# Patient Record
Sex: FEMALE | Race: WHITE | HISPANIC OR LATINO | URBAN - METROPOLITAN AREA
[De-identification: names, ages, dates, MRNs, and addresses within clinical notes are randomized per-mention and may not be internally consistent; named-entity substitution may affect disease eponyms.]

---

## 2017-01-04 ENCOUNTER — EMERGENCY (EMERGENCY)
Facility: HOSPITAL | Age: 42
LOS: 1 days | Discharge: HOME | End: 2017-01-04
Attending: EMERGENCY MEDICINE
Payer: MEDICAID

## 2017-01-04 VITALS
SYSTOLIC BLOOD PRESSURE: 131 MMHG | HEIGHT: 65 IN | HEART RATE: 101 BPM | WEIGHT: 147.71 LBS | OXYGEN SATURATION: 100 % | TEMPERATURE: 98 F | DIASTOLIC BLOOD PRESSURE: 76 MMHG | RESPIRATION RATE: 20 BRPM

## 2017-01-04 DIAGNOSIS — Z98.84 BARIATRIC SURGERY STATUS: Chronic | ICD-10-CM

## 2017-01-04 DIAGNOSIS — F41.9 ANXIETY DISORDER, UNSPECIFIED: ICD-10-CM

## 2017-01-04 DIAGNOSIS — K21.9 GASTRO-ESOPHAGEAL REFLUX DISEASE WITHOUT ESOPHAGITIS: ICD-10-CM

## 2017-01-04 DIAGNOSIS — R69 ILLNESS, UNSPECIFIED: Chronic | ICD-10-CM

## 2017-01-04 DIAGNOSIS — R42 DIZZINESS AND GIDDINESS: ICD-10-CM

## 2017-01-04 DIAGNOSIS — Z98.84 BARIATRIC SURGERY STATUS: ICD-10-CM

## 2017-01-04 DIAGNOSIS — Z98.89 OTHER SPECIFIED POSTPROCEDURAL STATES: Chronic | ICD-10-CM

## 2017-01-04 DIAGNOSIS — F32.89 OTHER SPECIFIED DEPRESSIVE EPISODES: ICD-10-CM

## 2017-01-04 DIAGNOSIS — Z90.49 ACQUIRED ABSENCE OF OTHER SPECIFIED PARTS OF DIGESTIVE TRACT: Chronic | ICD-10-CM

## 2017-01-04 PROCEDURE — 99284 EMERGENCY DEPT VISIT MOD MDM: CPT | Mod: 25

## 2017-01-05 LAB
ANION GAP SERPL CALC-SCNC: 6 MMOL/L — SIGNIFICANT CHANGE UP (ref 5–17)
BUN SERPL-MCNC: 15 MG/DL — SIGNIFICANT CHANGE UP (ref 7–18)
CALCIUM SERPL-MCNC: 8.9 MG/DL — SIGNIFICANT CHANGE UP (ref 8.4–10.5)
CHLORIDE SERPL-SCNC: 108 MMOL/L — SIGNIFICANT CHANGE UP (ref 96–108)
CO2 SERPL-SCNC: 29 MMOL/L — SIGNIFICANT CHANGE UP (ref 22–31)
CREAT SERPL-MCNC: 0.41 MG/DL — LOW (ref 0.5–1.3)
GLUCOSE SERPL-MCNC: 74 MG/DL — SIGNIFICANT CHANGE UP (ref 70–99)
HCT VFR BLD CALC: 35.6 % — SIGNIFICANT CHANGE UP (ref 34.5–45)
HGB BLD-MCNC: 11.3 G/DL — LOW (ref 11.5–15.5)
MCHC RBC-ENTMCNC: 27.4 PG — SIGNIFICANT CHANGE UP (ref 27–34)
MCHC RBC-ENTMCNC: 31.6 GM/DL — LOW (ref 32–36)
MCV RBC AUTO: 86.7 FL — SIGNIFICANT CHANGE UP (ref 80–100)
PLATELET # BLD AUTO: 285 K/UL — SIGNIFICANT CHANGE UP (ref 150–400)
POTASSIUM SERPL-MCNC: 4.1 MMOL/L — SIGNIFICANT CHANGE UP (ref 3.5–5.3)
POTASSIUM SERPL-SCNC: 4.1 MMOL/L — SIGNIFICANT CHANGE UP (ref 3.5–5.3)
RBC # BLD: 4.11 M/UL — SIGNIFICANT CHANGE UP (ref 3.8–5.2)
RBC # FLD: 25.7 % — HIGH (ref 10.3–14.5)
SODIUM SERPL-SCNC: 143 MMOL/L — SIGNIFICANT CHANGE UP (ref 135–145)
WBC # BLD: 8.3 K/UL — SIGNIFICANT CHANGE UP (ref 3.8–10.5)
WBC # FLD AUTO: 8.3 K/UL — SIGNIFICANT CHANGE UP (ref 3.8–10.5)

## 2017-01-05 PROCEDURE — 93005 ELECTROCARDIOGRAM TRACING: CPT

## 2017-01-05 PROCEDURE — 80048 BASIC METABOLIC PNL TOTAL CA: CPT

## 2017-01-05 PROCEDURE — 99283 EMERGENCY DEPT VISIT LOW MDM: CPT

## 2017-01-05 PROCEDURE — 85027 COMPLETE CBC AUTOMATED: CPT

## 2017-01-05 RX ORDER — MECLIZINE HCL 12.5 MG
25 TABLET ORAL ONCE
Qty: 0 | Refills: 0 | Status: COMPLETED | OUTPATIENT
Start: 2017-01-05 | End: 2017-01-05

## 2017-01-05 RX ORDER — DIAZEPAM 5 MG
5 TABLET ORAL ONCE
Qty: 0 | Refills: 0 | Status: DISCONTINUED | OUTPATIENT
Start: 2017-01-05 | End: 2017-01-05

## 2017-01-05 RX ORDER — DIAZEPAM 5 MG
1 TABLET ORAL
Qty: 28 | Refills: 0
Start: 2017-01-05 | End: 2017-01-12

## 2017-01-05 RX ORDER — MECLIZINE HCL 12.5 MG
1 TABLET ORAL
Qty: 42 | Refills: 0
Start: 2017-01-05 | End: 2017-01-19

## 2017-01-05 RX ADMIN — Medication 25 MILLIGRAM(S): at 00:53

## 2017-01-05 RX ADMIN — Medication 5 MILLIGRAM(S): at 00:53

## 2017-01-05 NOTE — ED PROVIDER NOTE - PSH
H/O gastric bypass  2014 gastrostomy, lysis of adhesions  Perforated viscus  exploratory drainage of intraabdominal abcess and removal of mesh ring from around gastric pouch on 8/3/2014  S/P  section  x 2  S/P cholecystectomy    S/P vein stripping  right leg

## 2017-01-05 NOTE — ED PROVIDER NOTE - PMH
Anxiety    Depression    GERD (gastroesophageal reflux disease)    Narcolepsy    Obesity    JEFFREY (obstructive sleep apnea)  resolved after gastric bypass  Scoliosis of lumbar spine

## 2017-01-05 NOTE — ED PROVIDER NOTE - NS ED MD SCRIBE ATTENDING SCRIBE SECTIONS
REVIEW OF SYSTEMS/PHYSICAL EXAM/HISTORY OF PRESENT ILLNESS/VITAL SIGNS( Pullset)/PAST MEDICAL/SURGICAL/SOCIAL HISTORY/DISPOSITION/HIV

## 2017-01-05 NOTE — ED PROVIDER NOTE - OBJECTIVE STATEMENT
40 y/o F pt with no significant PMHx presents to ED c/o worsening dizziness, like room spinning, x yesterday. Pt also endorses vomiting and back pain. Pt notes that symptoms are aggravated with movement, and that she is unable to complete daily tasks without stopping. Pt has been admitted in the past for low Iron, and received a blood transfusion. Pt denies tinnitus, or any other complaints. NKDA.

## 2017-01-05 NOTE — ED PROVIDER NOTE - MEDICAL DECISION MAKING DETAILS
40 y/o F presenting with dizziness, like room spinning, and nausea. Likely peripheral vertigo, will check EKG, electrolytes, and manage symptoms. 40 y/o F presenting with dizziness, like room spinning, and nausea. Likely peripheral vertigo, will check EKG, electrolytes, and manage symptoms.   ECG NSR, at 79. Electrolytes wnl. pt feels well. has not been vertiginous since arrival. discussed anticipatory guidance. ENT follow up. meclizine rx

## 2017-04-01 ENCOUNTER — EMERGENCY (EMERGENCY)
Facility: HOSPITAL | Age: 42
LOS: 1 days | Discharge: HOME | End: 2017-04-01
Attending: EMERGENCY MEDICINE
Payer: SELF-PAY

## 2017-04-01 DIAGNOSIS — G47.33 OBSTRUCTIVE SLEEP APNEA (ADULT) (PEDIATRIC): ICD-10-CM

## 2017-04-01 DIAGNOSIS — F41.9 ANXIETY DISORDER, UNSPECIFIED: ICD-10-CM

## 2017-04-01 DIAGNOSIS — R69 ILLNESS, UNSPECIFIED: Chronic | ICD-10-CM

## 2017-04-01 DIAGNOSIS — Z98.84 BARIATRIC SURGERY STATUS: Chronic | ICD-10-CM

## 2017-04-01 DIAGNOSIS — Z98.89 OTHER SPECIFIED POSTPROCEDURAL STATES: Chronic | ICD-10-CM

## 2017-04-01 DIAGNOSIS — G47.419 NARCOLEPSY WITHOUT CATAPLEXY: ICD-10-CM

## 2017-04-01 DIAGNOSIS — R19.7 DIARRHEA, UNSPECIFIED: ICD-10-CM

## 2017-04-01 DIAGNOSIS — Z90.49 ACQUIRED ABSENCE OF OTHER SPECIFIED PARTS OF DIGESTIVE TRACT: Chronic | ICD-10-CM

## 2017-04-01 DIAGNOSIS — F32.89 OTHER SPECIFIED DEPRESSIVE EPISODES: ICD-10-CM

## 2017-04-01 DIAGNOSIS — K21.9 GASTRO-ESOPHAGEAL REFLUX DISEASE WITHOUT ESOPHAGITIS: ICD-10-CM

## 2017-04-01 PROCEDURE — 99053 MED SERV 10PM-8AM 24 HR FAC: CPT

## 2017-04-01 PROCEDURE — 99284 EMERGENCY DEPT VISIT MOD MDM: CPT | Mod: 25

## 2017-04-02 VITALS
HEART RATE: 79 BPM | DIASTOLIC BLOOD PRESSURE: 71 MMHG | TEMPERATURE: 98 F | SYSTOLIC BLOOD PRESSURE: 107 MMHG | RESPIRATION RATE: 16 BRPM | OXYGEN SATURATION: 97 %

## 2017-04-02 VITALS
OXYGEN SATURATION: 95 % | HEART RATE: 95 BPM | DIASTOLIC BLOOD PRESSURE: 71 MMHG | SYSTOLIC BLOOD PRESSURE: 120 MMHG | WEIGHT: 145.06 LBS | RESPIRATION RATE: 16 BRPM | TEMPERATURE: 99 F

## 2017-04-02 LAB
ANION GAP SERPL CALC-SCNC: 7 MMOL/L — SIGNIFICANT CHANGE UP (ref 5–17)
APPEARANCE UR: CLEAR — SIGNIFICANT CHANGE UP
BASOPHILS # BLD AUTO: 0.2 K/UL — SIGNIFICANT CHANGE UP (ref 0–0.2)
BASOPHILS NFR BLD AUTO: 1.7 % — SIGNIFICANT CHANGE UP (ref 0–2)
BILIRUB UR-MCNC: ABNORMAL
BUN SERPL-MCNC: 24 MG/DL — HIGH (ref 7–18)
CALCIUM SERPL-MCNC: 8.5 MG/DL — SIGNIFICANT CHANGE UP (ref 8.4–10.5)
CHLORIDE SERPL-SCNC: 108 MMOL/L — SIGNIFICANT CHANGE UP (ref 96–108)
CO2 SERPL-SCNC: 26 MMOL/L — SIGNIFICANT CHANGE UP (ref 22–31)
COLOR SPEC: YELLOW — SIGNIFICANT CHANGE UP
CREAT SERPL-MCNC: 0.59 MG/DL — SIGNIFICANT CHANGE UP (ref 0.5–1.3)
DIFF PNL FLD: ABNORMAL
EOSINOPHIL # BLD AUTO: 0.1 K/UL — SIGNIFICANT CHANGE UP (ref 0–0.5)
EOSINOPHIL NFR BLD AUTO: 0.7 % — SIGNIFICANT CHANGE UP (ref 0–6)
GLUCOSE SERPL-MCNC: 121 MG/DL — HIGH (ref 70–99)
GLUCOSE UR QL: 250
HCG UR QL: NEGATIVE — SIGNIFICANT CHANGE UP
HCT VFR BLD CALC: 33.2 % — LOW (ref 34.5–45)
HGB BLD-MCNC: 10.4 G/DL — LOW (ref 11.5–15.5)
KETONES UR-MCNC: ABNORMAL
LEUKOCYTE ESTERASE UR-ACNC: NEGATIVE — SIGNIFICANT CHANGE UP
LYMPHOCYTES # BLD AUTO: 3 K/UL — SIGNIFICANT CHANGE UP (ref 1–3.3)
LYMPHOCYTES # BLD AUTO: 34.1 % — SIGNIFICANT CHANGE UP (ref 13–44)
MCHC RBC-ENTMCNC: 29.9 PG — SIGNIFICANT CHANGE UP (ref 27–34)
MCHC RBC-ENTMCNC: 31.4 GM/DL — LOW (ref 32–36)
MCV RBC AUTO: 95.3 FL — SIGNIFICANT CHANGE UP (ref 80–100)
MONOCYTES # BLD AUTO: 0.8 K/UL — SIGNIFICANT CHANGE UP (ref 0–0.9)
MONOCYTES NFR BLD AUTO: 8.5 % — SIGNIFICANT CHANGE UP (ref 2–14)
NEUTROPHILS # BLD AUTO: 4.9 K/UL — SIGNIFICANT CHANGE UP (ref 1.8–7.4)
NEUTROPHILS NFR BLD AUTO: 55 % — SIGNIFICANT CHANGE UP (ref 43–77)
NITRITE UR-MCNC: NEGATIVE — SIGNIFICANT CHANGE UP
PH UR: 5 — SIGNIFICANT CHANGE UP (ref 4.8–8)
PLATELET # BLD AUTO: 461 K/UL — HIGH (ref 150–400)
POTASSIUM SERPL-MCNC: 3.9 MMOL/L — SIGNIFICANT CHANGE UP (ref 3.5–5.3)
POTASSIUM SERPL-SCNC: 3.9 MMOL/L — SIGNIFICANT CHANGE UP (ref 3.5–5.3)
PROT UR-MCNC: 30 MG/DL
RBC # BLD: 3.48 M/UL — LOW (ref 3.8–5.2)
RBC # FLD: 13.8 % — SIGNIFICANT CHANGE UP (ref 10.3–14.5)
SODIUM SERPL-SCNC: 141 MMOL/L — SIGNIFICANT CHANGE UP (ref 135–145)
SP GR SPEC: 1.02 — SIGNIFICANT CHANGE UP (ref 1.01–1.02)
UROBILINOGEN FLD QL: 1
WBC # BLD: 8.9 K/UL — SIGNIFICANT CHANGE UP (ref 3.8–10.5)
WBC # FLD AUTO: 8.9 K/UL — SIGNIFICANT CHANGE UP (ref 3.8–10.5)

## 2017-04-02 PROCEDURE — 85027 COMPLETE CBC AUTOMATED: CPT

## 2017-04-02 PROCEDURE — 81001 URINALYSIS AUTO W/SCOPE: CPT

## 2017-04-02 PROCEDURE — 81025 URINE PREGNANCY TEST: CPT

## 2017-04-02 PROCEDURE — 99283 EMERGENCY DEPT VISIT LOW MDM: CPT

## 2017-04-02 PROCEDURE — 80048 BASIC METABOLIC PNL TOTAL CA: CPT

## 2017-04-02 RX ORDER — SODIUM CHLORIDE 9 MG/ML
1000 INJECTION INTRAMUSCULAR; INTRAVENOUS; SUBCUTANEOUS ONCE
Qty: 0 | Refills: 0 | Status: COMPLETED | OUTPATIENT
Start: 2017-04-02 | End: 2017-04-02

## 2017-04-02 RX ADMIN — SODIUM CHLORIDE 1000 MILLILITER(S): 9 INJECTION INTRAMUSCULAR; INTRAVENOUS; SUBCUTANEOUS at 02:22

## 2017-04-02 NOTE — ED PROVIDER NOTE - OBJECTIVE STATEMENT
42 y/o female with past medical history of GERD and scoliosis and past surgical history of gastric sleeve presents with nausea and diarrhea.  pt denies vomiting.  Symptoms associated with mild epigastric discomfort.  Pt also with complaint of chronic back pain.  denies trauma, denies urinary symptoms.

## 2017-04-02 NOTE — ED ADULT NURSE NOTE - OBJECTIVE STATEMENT
41 years old  female with past medical history of GERD and scoliosis and past surgical history of gastric sleeve presents with nausea and diarrhea.  pt denies vomiting.  Symptoms associated with mild epigastric discomfort.  Pt also with complaint of chronic back pain.  denies trauma, denies urinary symptoms.

## 2017-04-02 NOTE — ED PROVIDER NOTE - MEDICAL DECISION MAKING DETAILS
Pt with nausea and diarrhea, no vomiting.  Will give GI cocktail, percocet for chronic back pain and reassess. Will also check UA for infection.  As per patient, she is currently on her menstrual cycle.

## 2018-01-01 ENCOUNTER — OUTPATIENT (OUTPATIENT)
Dept: OUTPATIENT SERVICES | Facility: HOSPITAL | Age: 43
LOS: 1 days | End: 2018-01-01
Payer: MEDICAID

## 2018-01-01 DIAGNOSIS — Z90.49 ACQUIRED ABSENCE OF OTHER SPECIFIED PARTS OF DIGESTIVE TRACT: Chronic | ICD-10-CM

## 2018-01-01 DIAGNOSIS — Z98.89 OTHER SPECIFIED POSTPROCEDURAL STATES: Chronic | ICD-10-CM

## 2018-01-01 DIAGNOSIS — Z98.84 BARIATRIC SURGERY STATUS: Chronic | ICD-10-CM

## 2018-01-01 DIAGNOSIS — R69 ILLNESS, UNSPECIFIED: Chronic | ICD-10-CM

## 2018-01-01 PROCEDURE — G9001: CPT

## 2018-01-26 ENCOUNTER — EMERGENCY (EMERGENCY)
Facility: HOSPITAL | Age: 43
LOS: 1 days | Discharge: HOME | End: 2018-01-26
Attending: EMERGENCY MEDICINE
Payer: MEDICAID

## 2018-01-26 VITALS
OXYGEN SATURATION: 100 % | DIASTOLIC BLOOD PRESSURE: 85 MMHG | HEART RATE: 72 BPM | RESPIRATION RATE: 16 BRPM | WEIGHT: 154.1 LBS | SYSTOLIC BLOOD PRESSURE: 131 MMHG | HEIGHT: 65 IN | TEMPERATURE: 98 F

## 2018-01-26 DIAGNOSIS — Z90.49 ACQUIRED ABSENCE OF OTHER SPECIFIED PARTS OF DIGESTIVE TRACT: Chronic | ICD-10-CM

## 2018-01-26 DIAGNOSIS — Z98.84 BARIATRIC SURGERY STATUS: Chronic | ICD-10-CM

## 2018-01-26 DIAGNOSIS — R69 ILLNESS, UNSPECIFIED: Chronic | ICD-10-CM

## 2018-01-26 DIAGNOSIS — Z98.89 OTHER SPECIFIED POSTPROCEDURAL STATES: Chronic | ICD-10-CM

## 2018-01-26 PROCEDURE — 99285 EMERGENCY DEPT VISIT HI MDM: CPT | Mod: 25

## 2018-01-26 RX ORDER — SUCRALFATE 1 G
1 TABLET ORAL ONCE
Qty: 0 | Refills: 0 | Status: COMPLETED | OUTPATIENT
Start: 2018-01-26 | End: 2018-01-26

## 2018-01-26 RX ORDER — ONDANSETRON 8 MG/1
4 TABLET, FILM COATED ORAL ONCE
Qty: 0 | Refills: 0 | Status: COMPLETED | OUTPATIENT
Start: 2018-01-26 | End: 2018-01-26

## 2018-01-26 RX ORDER — SODIUM CHLORIDE 9 MG/ML
1000 INJECTION INTRAMUSCULAR; INTRAVENOUS; SUBCUTANEOUS ONCE
Qty: 0 | Refills: 0 | Status: COMPLETED | OUTPATIENT
Start: 2018-01-26 | End: 2018-01-26

## 2018-01-26 RX ORDER — FAMOTIDINE 10 MG/ML
20 INJECTION INTRAVENOUS ONCE
Qty: 0 | Refills: 0 | Status: COMPLETED | OUTPATIENT
Start: 2018-01-26 | End: 2018-01-26

## 2018-01-27 VITALS
HEART RATE: 91 BPM | RESPIRATION RATE: 16 BRPM | OXYGEN SATURATION: 99 % | SYSTOLIC BLOOD PRESSURE: 122 MMHG | DIASTOLIC BLOOD PRESSURE: 78 MMHG | TEMPERATURE: 98 F

## 2018-01-27 LAB
ALBUMIN SERPL ELPH-MCNC: 4 G/DL — SIGNIFICANT CHANGE UP (ref 3.5–5)
ALP SERPL-CCNC: 86 U/L — SIGNIFICANT CHANGE UP (ref 40–120)
ALT FLD-CCNC: 26 U/L DA — SIGNIFICANT CHANGE UP (ref 10–60)
ANION GAP SERPL CALC-SCNC: 7 MMOL/L — SIGNIFICANT CHANGE UP (ref 5–17)
APPEARANCE UR: CLEAR — SIGNIFICANT CHANGE UP
AST SERPL-CCNC: 16 U/L — SIGNIFICANT CHANGE UP (ref 10–40)
BASOPHILS # BLD AUTO: 0.1 K/UL — SIGNIFICANT CHANGE UP (ref 0–0.2)
BASOPHILS NFR BLD AUTO: 1.6 % — SIGNIFICANT CHANGE UP (ref 0–2)
BILIRUB SERPL-MCNC: 0.6 MG/DL — SIGNIFICANT CHANGE UP (ref 0.2–1.2)
BILIRUB UR-MCNC: ABNORMAL
BUN SERPL-MCNC: 18 MG/DL — SIGNIFICANT CHANGE UP (ref 7–18)
CALCIUM SERPL-MCNC: 8.7 MG/DL — SIGNIFICANT CHANGE UP (ref 8.4–10.5)
CHLORIDE SERPL-SCNC: 107 MMOL/L — SIGNIFICANT CHANGE UP (ref 96–108)
CO2 SERPL-SCNC: 27 MMOL/L — SIGNIFICANT CHANGE UP (ref 22–31)
COLOR SPEC: YELLOW — SIGNIFICANT CHANGE UP
CREAT SERPL-MCNC: 0.65 MG/DL — SIGNIFICANT CHANGE UP (ref 0.5–1.3)
DIFF PNL FLD: ABNORMAL
EOSINOPHIL # BLD AUTO: 0 K/UL — SIGNIFICANT CHANGE UP (ref 0–0.5)
EOSINOPHIL NFR BLD AUTO: 0.6 % — SIGNIFICANT CHANGE UP (ref 0–6)
GLUCOSE SERPL-MCNC: 94 MG/DL — SIGNIFICANT CHANGE UP (ref 70–99)
GLUCOSE UR QL: NEGATIVE — SIGNIFICANT CHANGE UP
HCG SERPL-ACNC: <1 MIU/ML — SIGNIFICANT CHANGE UP
HCT VFR BLD CALC: 39.1 % — SIGNIFICANT CHANGE UP (ref 34.5–45)
HGB BLD-MCNC: 11.7 G/DL — SIGNIFICANT CHANGE UP (ref 11.5–15.5)
KETONES UR-MCNC: NEGATIVE — SIGNIFICANT CHANGE UP
LEUKOCYTE ESTERASE UR-ACNC: ABNORMAL
LIDOCAIN IGE QN: 340 U/L — SIGNIFICANT CHANGE UP (ref 73–393)
LYMPHOCYTES # BLD AUTO: 2.8 K/UL — SIGNIFICANT CHANGE UP (ref 1–3.3)
LYMPHOCYTES # BLD AUTO: 39.3 % — SIGNIFICANT CHANGE UP (ref 13–44)
MCHC RBC-ENTMCNC: 29.9 GM/DL — LOW (ref 32–36)
MCHC RBC-ENTMCNC: 33.3 PG — SIGNIFICANT CHANGE UP (ref 27–34)
MCV RBC AUTO: 111.4 FL — HIGH (ref 80–100)
MONOCYTES # BLD AUTO: 0.6 K/UL — SIGNIFICANT CHANGE UP (ref 0–0.9)
MONOCYTES NFR BLD AUTO: 8.4 % — SIGNIFICANT CHANGE UP (ref 2–14)
NEUTROPHILS # BLD AUTO: 3.5 K/UL — SIGNIFICANT CHANGE UP (ref 1.8–7.4)
NEUTROPHILS NFR BLD AUTO: 50.1 % — SIGNIFICANT CHANGE UP (ref 43–77)
NITRITE UR-MCNC: NEGATIVE — SIGNIFICANT CHANGE UP
PH UR: 5 — SIGNIFICANT CHANGE UP (ref 5–8)
PLATELET # BLD AUTO: 405 K/UL — HIGH (ref 150–400)
POTASSIUM SERPL-MCNC: 4.3 MMOL/L — SIGNIFICANT CHANGE UP (ref 3.5–5.3)
POTASSIUM SERPL-SCNC: 4.3 MMOL/L — SIGNIFICANT CHANGE UP (ref 3.5–5.3)
PROT SERPL-MCNC: 7.7 G/DL — SIGNIFICANT CHANGE UP (ref 6–8.3)
PROT UR-MCNC: 30 MG/DL
RBC # BLD: 3.51 M/UL — LOW (ref 3.8–5.2)
RBC # FLD: 12.1 % — SIGNIFICANT CHANGE UP (ref 10.3–14.5)
SODIUM SERPL-SCNC: 141 MMOL/L — SIGNIFICANT CHANGE UP (ref 135–145)
SP GR SPEC: 1.02 — SIGNIFICANT CHANGE UP (ref 1.01–1.02)
UROBILINOGEN FLD QL: 1
WBC # BLD: 7 K/UL — SIGNIFICANT CHANGE UP (ref 3.8–10.5)
WBC # FLD AUTO: 7 K/UL — SIGNIFICANT CHANGE UP (ref 3.8–10.5)

## 2018-01-27 PROCEDURE — 96375 TX/PRO/DX INJ NEW DRUG ADDON: CPT

## 2018-01-27 PROCEDURE — 74177 CT ABD & PELVIS W/CONTRAST: CPT

## 2018-01-27 PROCEDURE — 80053 COMPREHEN METABOLIC PANEL: CPT

## 2018-01-27 PROCEDURE — 83690 ASSAY OF LIPASE: CPT

## 2018-01-27 PROCEDURE — 87086 URINE CULTURE/COLONY COUNT: CPT

## 2018-01-27 PROCEDURE — 96374 THER/PROPH/DIAG INJ IV PUSH: CPT | Mod: XU

## 2018-01-27 PROCEDURE — 84702 CHORIONIC GONADOTROPIN TEST: CPT

## 2018-01-27 PROCEDURE — 93005 ELECTROCARDIOGRAM TRACING: CPT

## 2018-01-27 PROCEDURE — 81001 URINALYSIS AUTO W/SCOPE: CPT

## 2018-01-27 PROCEDURE — 85027 COMPLETE CBC AUTOMATED: CPT

## 2018-01-27 PROCEDURE — 93010 ELECTROCARDIOGRAM REPORT: CPT

## 2018-01-27 PROCEDURE — 99284 EMERGENCY DEPT VISIT MOD MDM: CPT | Mod: 25

## 2018-01-27 PROCEDURE — 74177 CT ABD & PELVIS W/CONTRAST: CPT | Mod: 26

## 2018-01-27 RX ORDER — MORPHINE SULFATE 50 MG/1
4 CAPSULE, EXTENDED RELEASE ORAL ONCE
Qty: 0 | Refills: 0 | Status: COMPLETED | OUTPATIENT
Start: 2018-01-27 | End: 2018-01-27

## 2018-01-27 RX ORDER — FAMOTIDINE 10 MG/ML
1 INJECTION INTRAVENOUS
Qty: 14 | Refills: 0
Start: 2018-01-27 | End: 2018-02-02

## 2018-01-27 RX ORDER — MORPHINE SULFATE 50 MG/1
4 CAPSULE, EXTENDED RELEASE ORAL ONCE
Qty: 0 | Refills: 0 | Status: DISCONTINUED | OUTPATIENT
Start: 2018-01-27 | End: 2018-01-27

## 2018-01-27 RX ADMIN — ONDANSETRON 4 MILLIGRAM(S): 8 TABLET, FILM COATED ORAL at 01:18

## 2018-01-27 RX ADMIN — FAMOTIDINE 20 MILLIGRAM(S): 10 INJECTION INTRAVENOUS at 01:18

## 2018-01-27 RX ADMIN — Medication 1 GRAM(S): at 05:34

## 2018-01-27 RX ADMIN — SODIUM CHLORIDE 1000 MILLILITER(S): 9 INJECTION INTRAMUSCULAR; INTRAVENOUS; SUBCUTANEOUS at 01:18

## 2018-01-27 RX ADMIN — MORPHINE SULFATE 4 MILLIGRAM(S): 50 CAPSULE, EXTENDED RELEASE ORAL at 05:35

## 2018-01-27 RX ADMIN — MORPHINE SULFATE 4 MILLIGRAM(S): 50 CAPSULE, EXTENDED RELEASE ORAL at 06:12

## 2018-01-27 NOTE — ED PROVIDER NOTE - OBJECTIVE STATEMENT
36 y/o F pt w/ PMHx of GERD and renal stone and PSHx of gastric bypass, presents to ED c/o abd pain x yesterday. Pt reports worsening pain, described burning, associated w/ dizziness and 2 episodes of vomiting. Pt states she has been taking Tylenol w/ no improvement of sx. Pt states she was seen here a few months for similar sx. Pt denies any difficulty breathing, fever, burning w/ urination, or any other complaints. ALLERGIES as listed below.

## 2018-01-27 NOTE — ED PROVIDER NOTE - PMH
Anxiety    Depression    GERD (gastroesophageal reflux disease)    Narcolepsy    Obesity    JEFFREY (obstructive sleep apnea)  resolved after gastric bypass  Renal stone    Scoliosis of lumbar spine

## 2018-01-28 LAB
CULTURE RESULTS: SIGNIFICANT CHANGE UP
SPECIMEN SOURCE: SIGNIFICANT CHANGE UP

## 2018-01-30 DIAGNOSIS — R69 ILLNESS, UNSPECIFIED: ICD-10-CM

## 2018-10-02 ENCOUNTER — EMERGENCY (EMERGENCY)
Facility: HOSPITAL | Age: 43
LOS: 1 days | Discharge: HOME | End: 2018-10-02
Attending: EMERGENCY MEDICINE
Payer: MEDICAID

## 2018-10-02 VITALS
TEMPERATURE: 98 F | DIASTOLIC BLOOD PRESSURE: 56 MMHG | RESPIRATION RATE: 18 BRPM | HEART RATE: 73 BPM | SYSTOLIC BLOOD PRESSURE: 103 MMHG | OXYGEN SATURATION: 100 %

## 2018-10-02 VITALS
RESPIRATION RATE: 20 BRPM | HEART RATE: 77 BPM | OXYGEN SATURATION: 100 % | SYSTOLIC BLOOD PRESSURE: 148 MMHG | WEIGHT: 154.98 LBS | TEMPERATURE: 98 F | DIASTOLIC BLOOD PRESSURE: 88 MMHG | HEIGHT: 65 IN

## 2018-10-02 DIAGNOSIS — R69 ILLNESS, UNSPECIFIED: Chronic | ICD-10-CM

## 2018-10-02 DIAGNOSIS — Z98.89 OTHER SPECIFIED POSTPROCEDURAL STATES: Chronic | ICD-10-CM

## 2018-10-02 DIAGNOSIS — Z90.49 ACQUIRED ABSENCE OF OTHER SPECIFIED PARTS OF DIGESTIVE TRACT: Chronic | ICD-10-CM

## 2018-10-02 DIAGNOSIS — Z98.84 BARIATRIC SURGERY STATUS: Chronic | ICD-10-CM

## 2018-10-02 PROBLEM — N20.0 CALCULUS OF KIDNEY: Chronic | Status: ACTIVE | Noted: 2018-01-27

## 2018-10-02 LAB
ALBUMIN SERPL ELPH-MCNC: 3.4 G/DL — LOW (ref 3.5–5)
ALP SERPL-CCNC: 84 U/L — SIGNIFICANT CHANGE UP (ref 40–120)
ALT FLD-CCNC: 25 U/L DA — SIGNIFICANT CHANGE UP (ref 10–60)
ANION GAP SERPL CALC-SCNC: 6 MMOL/L — SIGNIFICANT CHANGE UP (ref 5–17)
APPEARANCE UR: CLEAR — SIGNIFICANT CHANGE UP
AST SERPL-CCNC: 22 U/L — SIGNIFICANT CHANGE UP (ref 10–40)
BASOPHILS # BLD AUTO: 0.1 K/UL — SIGNIFICANT CHANGE UP (ref 0–0.2)
BASOPHILS NFR BLD AUTO: 1.5 % — SIGNIFICANT CHANGE UP (ref 0–2)
BILIRUB SERPL-MCNC: 0.3 MG/DL — SIGNIFICANT CHANGE UP (ref 0.2–1.2)
BILIRUB UR-MCNC: NEGATIVE — SIGNIFICANT CHANGE UP
BUN SERPL-MCNC: 20 MG/DL — HIGH (ref 7–18)
CALCIUM SERPL-MCNC: 8.6 MG/DL — SIGNIFICANT CHANGE UP (ref 8.4–10.5)
CHLORIDE SERPL-SCNC: 107 MMOL/L — SIGNIFICANT CHANGE UP (ref 96–108)
CO2 SERPL-SCNC: 28 MMOL/L — SIGNIFICANT CHANGE UP (ref 22–31)
COLOR SPEC: YELLOW — SIGNIFICANT CHANGE UP
CREAT SERPL-MCNC: 0.64 MG/DL — SIGNIFICANT CHANGE UP (ref 0.5–1.3)
DIFF PNL FLD: ABNORMAL
EOSINOPHIL # BLD AUTO: 0.2 K/UL — SIGNIFICANT CHANGE UP (ref 0–0.5)
EOSINOPHIL NFR BLD AUTO: 2.1 % — SIGNIFICANT CHANGE UP (ref 0–6)
GLUCOSE SERPL-MCNC: 90 MG/DL — SIGNIFICANT CHANGE UP (ref 70–99)
GLUCOSE UR QL: NEGATIVE — SIGNIFICANT CHANGE UP
HCG SERPL-ACNC: <1 MIU/ML — SIGNIFICANT CHANGE UP
HCT VFR BLD CALC: 34.3 % — LOW (ref 34.5–45)
HGB BLD-MCNC: 10.6 G/DL — LOW (ref 11.5–15.5)
KETONES UR-MCNC: NEGATIVE — SIGNIFICANT CHANGE UP
LEUKOCYTE ESTERASE UR-ACNC: ABNORMAL
LIDOCAIN IGE QN: 261 U/L — SIGNIFICANT CHANGE UP (ref 73–393)
LYMPHOCYTES # BLD AUTO: 2.8 K/UL — SIGNIFICANT CHANGE UP (ref 1–3.3)
LYMPHOCYTES # BLD AUTO: 29.8 % — SIGNIFICANT CHANGE UP (ref 13–44)
MCHC RBC-ENTMCNC: 27.8 PG — SIGNIFICANT CHANGE UP (ref 27–34)
MCHC RBC-ENTMCNC: 30.7 GM/DL — LOW (ref 32–36)
MCV RBC AUTO: 90.4 FL — SIGNIFICANT CHANGE UP (ref 80–100)
MONOCYTES # BLD AUTO: 0.8 K/UL — SIGNIFICANT CHANGE UP (ref 0–0.9)
MONOCYTES NFR BLD AUTO: 9 % — SIGNIFICANT CHANGE UP (ref 2–14)
NEUTROPHILS # BLD AUTO: 5.4 K/UL — SIGNIFICANT CHANGE UP (ref 1.8–7.4)
NEUTROPHILS NFR BLD AUTO: 57.5 % — SIGNIFICANT CHANGE UP (ref 43–77)
NITRITE UR-MCNC: NEGATIVE — SIGNIFICANT CHANGE UP
PH UR: 6.5 — SIGNIFICANT CHANGE UP (ref 5–8)
PLATELET # BLD AUTO: 354 K/UL — SIGNIFICANT CHANGE UP (ref 150–400)
POTASSIUM SERPL-MCNC: 4.8 MMOL/L — SIGNIFICANT CHANGE UP (ref 3.5–5.3)
POTASSIUM SERPL-SCNC: 4.8 MMOL/L — SIGNIFICANT CHANGE UP (ref 3.5–5.3)
PROT SERPL-MCNC: 7.5 G/DL — SIGNIFICANT CHANGE UP (ref 6–8.3)
PROT UR-MCNC: 15
RBC # BLD: 3.79 M/UL — LOW (ref 3.8–5.2)
RBC # FLD: 13.6 % — SIGNIFICANT CHANGE UP (ref 10.3–14.5)
SODIUM SERPL-SCNC: 141 MMOL/L — SIGNIFICANT CHANGE UP (ref 135–145)
SP GR SPEC: 1.01 — SIGNIFICANT CHANGE UP (ref 1.01–1.02)
UROBILINOGEN FLD QL: NEGATIVE — SIGNIFICANT CHANGE UP
WBC # BLD: 9.4 K/UL — SIGNIFICANT CHANGE UP (ref 3.8–10.5)
WBC # FLD AUTO: 9.4 K/UL — SIGNIFICANT CHANGE UP (ref 3.8–10.5)

## 2018-10-02 PROCEDURE — 85027 COMPLETE CBC AUTOMATED: CPT

## 2018-10-02 PROCEDURE — 99285 EMERGENCY DEPT VISIT HI MDM: CPT | Mod: 25

## 2018-10-02 PROCEDURE — 74176 CT ABD & PELVIS W/O CONTRAST: CPT | Mod: 26

## 2018-10-02 PROCEDURE — 81001 URINALYSIS AUTO W/SCOPE: CPT

## 2018-10-02 PROCEDURE — 96375 TX/PRO/DX INJ NEW DRUG ADDON: CPT

## 2018-10-02 PROCEDURE — 84702 CHORIONIC GONADOTROPIN TEST: CPT

## 2018-10-02 PROCEDURE — 80053 COMPREHEN METABOLIC PANEL: CPT

## 2018-10-02 PROCEDURE — 96374 THER/PROPH/DIAG INJ IV PUSH: CPT

## 2018-10-02 PROCEDURE — 99284 EMERGENCY DEPT VISIT MOD MDM: CPT | Mod: 25

## 2018-10-02 PROCEDURE — 83690 ASSAY OF LIPASE: CPT

## 2018-10-02 PROCEDURE — 74176 CT ABD & PELVIS W/O CONTRAST: CPT

## 2018-10-02 RX ORDER — CEFUROXIME AXETIL 250 MG
500 TABLET ORAL ONCE
Qty: 0 | Refills: 0 | Status: DISCONTINUED | OUTPATIENT
Start: 2018-10-02 | End: 2018-10-06

## 2018-10-02 RX ORDER — SODIUM CHLORIDE 9 MG/ML
1000 INJECTION INTRAMUSCULAR; INTRAVENOUS; SUBCUTANEOUS
Qty: 0 | Refills: 0 | Status: DISCONTINUED | OUTPATIENT
Start: 2018-10-02 | End: 2018-10-06

## 2018-10-02 RX ORDER — SODIUM CHLORIDE 9 MG/ML
3 INJECTION INTRAMUSCULAR; INTRAVENOUS; SUBCUTANEOUS ONCE
Qty: 0 | Refills: 0 | Status: COMPLETED | OUTPATIENT
Start: 2018-10-02 | End: 2018-10-02

## 2018-10-02 RX ORDER — SODIUM CHLORIDE 9 MG/ML
1000 INJECTION INTRAMUSCULAR; INTRAVENOUS; SUBCUTANEOUS ONCE
Qty: 0 | Refills: 0 | Status: COMPLETED | OUTPATIENT
Start: 2018-10-02 | End: 2018-10-02

## 2018-10-02 RX ORDER — ONDANSETRON 8 MG/1
4 TABLET, FILM COATED ORAL ONCE
Qty: 0 | Refills: 0 | Status: COMPLETED | OUTPATIENT
Start: 2018-10-02 | End: 2018-10-02

## 2018-10-02 RX ORDER — CEFUROXIME AXETIL 250 MG
1 TABLET ORAL
Qty: 14 | Refills: 0
Start: 2018-10-02 | End: 2018-10-08

## 2018-10-02 RX ORDER — KETOROLAC TROMETHAMINE 30 MG/ML
30 SYRINGE (ML) INJECTION ONCE
Qty: 0 | Refills: 0 | Status: DISCONTINUED | OUTPATIENT
Start: 2018-10-02 | End: 2018-10-02

## 2018-10-02 RX ORDER — PHENAZOPYRIDINE HCL 100 MG
1 TABLET ORAL
Qty: 6 | Refills: 0
Start: 2018-10-02 | End: 2018-10-03

## 2018-10-02 RX ADMIN — SODIUM CHLORIDE 125 MILLILITER(S): 9 INJECTION INTRAMUSCULAR; INTRAVENOUS; SUBCUTANEOUS at 05:15

## 2018-10-02 RX ADMIN — SODIUM CHLORIDE 3 MILLILITER(S): 9 INJECTION INTRAMUSCULAR; INTRAVENOUS; SUBCUTANEOUS at 02:50

## 2018-10-02 RX ADMIN — SODIUM CHLORIDE 1000 MILLILITER(S): 9 INJECTION INTRAMUSCULAR; INTRAVENOUS; SUBCUTANEOUS at 02:49

## 2018-10-02 RX ADMIN — Medication 30 MILLIGRAM(S): at 05:14

## 2018-10-02 RX ADMIN — ONDANSETRON 4 MILLIGRAM(S): 8 TABLET, FILM COATED ORAL at 02:49

## 2018-10-02 NOTE — ED ADULT NURSE NOTE - NSIMPLEMENTINTERV_GEN_ALL_ED
Implemented All Universal Safety Interventions:  [place] to call system. Call bell, personal items and telephone within reach. Instruct patient to call for assistance. Room bathroom lighting operational. Non-slip footwear when patient is off stretcher. Physically safe environment: no spills, clutter or unnecessary equipment. Stretcher in lowest position, wheels locked, appropriate side rails in place.

## 2018-10-02 NOTE — ED ADULT TRIAGE NOTE - CHIEF COMPLAINT QUOTE
pt complain of stomach pain with nausea, vomiting and diarrhea started 2 today ago, pain is getting worse

## 2018-10-02 NOTE — ED PROVIDER NOTE - CONSTITUTIONAL, MLM
Rebecca normal... Well appearing, well nourished, awake, alert, oriented to person, place, time/situation and in no apparent distress.

## 2018-10-02 NOTE — ED PROVIDER NOTE - OBJECTIVE STATEMENT
Chief complaint of lower abdominal; pain, right flank pain x 2 days, + dysuria. No vomiting, no fever.  Denies vaginal bleeding.

## 2018-10-02 NOTE — ED PROVIDER NOTE - MEDICAL DECISION MAKING DETAILS
No obstruction reported on Ct. Pt is well appearing walking with normal gait, stable for discharge and follow up with medical doctor. Pt educated on care and need for follow up. Discussed anticipatory guidance and return precautions. Questions answered. I had a detailed discussion with the patient and/or guardian regarding the historical points, exam findings, and any diagnostic results supporting the discharge diagnosis. Rx: Ceftin.

## 2019-01-30 ENCOUNTER — EMERGENCY (EMERGENCY)
Facility: HOSPITAL | Age: 44
LOS: 1 days | Discharge: HOME | End: 2019-01-30
Attending: EMERGENCY MEDICINE
Payer: MEDICAID

## 2019-01-30 VITALS
HEIGHT: 65 IN | SYSTOLIC BLOOD PRESSURE: 128 MMHG | OXYGEN SATURATION: 100 % | RESPIRATION RATE: 18 BRPM | TEMPERATURE: 97 F | DIASTOLIC BLOOD PRESSURE: 87 MMHG | WEIGHT: 149.91 LBS | HEART RATE: 89 BPM

## 2019-01-30 DIAGNOSIS — Z98.89 OTHER SPECIFIED POSTPROCEDURAL STATES: Chronic | ICD-10-CM

## 2019-01-30 DIAGNOSIS — Z98.84 BARIATRIC SURGERY STATUS: Chronic | ICD-10-CM

## 2019-01-30 DIAGNOSIS — R69 ILLNESS, UNSPECIFIED: Chronic | ICD-10-CM

## 2019-01-30 DIAGNOSIS — Z90.49 ACQUIRED ABSENCE OF OTHER SPECIFIED PARTS OF DIGESTIVE TRACT: Chronic | ICD-10-CM

## 2019-01-30 LAB
ALBUMIN SERPL ELPH-MCNC: 3.8 G/DL — SIGNIFICANT CHANGE UP (ref 3.5–5)
ALP SERPL-CCNC: 83 U/L — SIGNIFICANT CHANGE UP (ref 40–120)
ALT FLD-CCNC: 22 U/L DA — SIGNIFICANT CHANGE UP (ref 10–60)
ANION GAP SERPL CALC-SCNC: 7 MMOL/L — SIGNIFICANT CHANGE UP (ref 5–17)
APPEARANCE UR: CLEAR — SIGNIFICANT CHANGE UP
APTT BLD: 26.7 SEC — LOW (ref 27.5–36.3)
AST SERPL-CCNC: 18 U/L — SIGNIFICANT CHANGE UP (ref 10–40)
BACTERIA # UR AUTO: ABNORMAL /HPF
BASOPHILS # BLD AUTO: 0.1 K/UL — SIGNIFICANT CHANGE UP (ref 0–0.2)
BASOPHILS NFR BLD AUTO: 1.4 % — SIGNIFICANT CHANGE UP (ref 0–2)
BILIRUB SERPL-MCNC: 0.7 MG/DL — SIGNIFICANT CHANGE UP (ref 0.2–1.2)
BILIRUB UR-MCNC: NEGATIVE — SIGNIFICANT CHANGE UP
BUN SERPL-MCNC: 12 MG/DL — SIGNIFICANT CHANGE UP (ref 7–18)
CALCIUM SERPL-MCNC: 8.6 MG/DL — SIGNIFICANT CHANGE UP (ref 8.4–10.5)
CHLORIDE SERPL-SCNC: 107 MMOL/L — SIGNIFICANT CHANGE UP (ref 96–108)
CK SERPL-CCNC: 60 U/L — SIGNIFICANT CHANGE UP (ref 21–215)
CO2 SERPL-SCNC: 27 MMOL/L — SIGNIFICANT CHANGE UP (ref 22–31)
COLOR SPEC: YELLOW — SIGNIFICANT CHANGE UP
CREAT SERPL-MCNC: 0.61 MG/DL — SIGNIFICANT CHANGE UP (ref 0.5–1.3)
DIFF PNL FLD: ABNORMAL
EOSINOPHIL # BLD AUTO: 0.1 K/UL — SIGNIFICANT CHANGE UP (ref 0–0.5)
EOSINOPHIL NFR BLD AUTO: 0.8 % — SIGNIFICANT CHANGE UP (ref 0–6)
EPI CELLS # UR: ABNORMAL /HPF
GLUCOSE SERPL-MCNC: 103 MG/DL — HIGH (ref 70–99)
GLUCOSE UR QL: NEGATIVE — SIGNIFICANT CHANGE UP
HCG UR QL: NEGATIVE — SIGNIFICANT CHANGE UP
HCT VFR BLD CALC: 33.5 % — LOW (ref 34.5–45)
HGB BLD-MCNC: 10.1 G/DL — LOW (ref 11.5–15.5)
INR BLD: 0.98 RATIO — SIGNIFICANT CHANGE UP (ref 0.88–1.16)
KETONES UR-MCNC: NEGATIVE — SIGNIFICANT CHANGE UP
LEUKOCYTE ESTERASE UR-ACNC: ABNORMAL
LYMPHOCYTES # BLD AUTO: 2.1 K/UL — SIGNIFICANT CHANGE UP (ref 1–3.3)
LYMPHOCYTES # BLD AUTO: 25.8 % — SIGNIFICANT CHANGE UP (ref 13–44)
MCHC RBC-ENTMCNC: 27 PG — SIGNIFICANT CHANGE UP (ref 27–34)
MCHC RBC-ENTMCNC: 30.2 GM/DL — LOW (ref 32–36)
MCV RBC AUTO: 89.5 FL — SIGNIFICANT CHANGE UP (ref 80–100)
MONOCYTES # BLD AUTO: 0.6 K/UL — SIGNIFICANT CHANGE UP (ref 0–0.9)
MONOCYTES NFR BLD AUTO: 7.3 % — SIGNIFICANT CHANGE UP (ref 2–14)
NEUTROPHILS # BLD AUTO: 5.4 K/UL — SIGNIFICANT CHANGE UP (ref 1.8–7.4)
NEUTROPHILS NFR BLD AUTO: 64.6 % — SIGNIFICANT CHANGE UP (ref 43–77)
NITRITE UR-MCNC: NEGATIVE — SIGNIFICANT CHANGE UP
PH UR: 6.5 — SIGNIFICANT CHANGE UP (ref 5–8)
PLATELET # BLD AUTO: 422 K/UL — HIGH (ref 150–400)
POTASSIUM SERPL-MCNC: 4.1 MMOL/L — SIGNIFICANT CHANGE UP (ref 3.5–5.3)
POTASSIUM SERPL-SCNC: 4.1 MMOL/L — SIGNIFICANT CHANGE UP (ref 3.5–5.3)
PROT SERPL-MCNC: 7.7 G/DL — SIGNIFICANT CHANGE UP (ref 6–8.3)
PROT UR-MCNC: NEGATIVE — SIGNIFICANT CHANGE UP
PROTHROM AB SERPL-ACNC: 10.9 SEC — SIGNIFICANT CHANGE UP (ref 10–12.9)
RBC # BLD: 3.75 M/UL — LOW (ref 3.8–5.2)
RBC # FLD: 15.2 % — HIGH (ref 10.3–14.5)
RBC CASTS # UR COMP ASSIST: ABNORMAL /HPF (ref 0–2)
SODIUM SERPL-SCNC: 141 MMOL/L — SIGNIFICANT CHANGE UP (ref 135–145)
SP GR SPEC: 1.01 — SIGNIFICANT CHANGE UP (ref 1.01–1.02)
TROPONIN I SERPL-MCNC: <0.015 NG/ML — SIGNIFICANT CHANGE UP (ref 0–0.04)
UROBILINOGEN FLD QL: NEGATIVE — SIGNIFICANT CHANGE UP
WBC # BLD: 8.3 K/UL — SIGNIFICANT CHANGE UP (ref 3.8–10.5)
WBC # FLD AUTO: 8.3 K/UL — SIGNIFICANT CHANGE UP (ref 3.8–10.5)
WBC UR QL: ABNORMAL /HPF (ref 0–5)

## 2019-01-30 PROCEDURE — 93010 ELECTROCARDIOGRAM REPORT: CPT

## 2019-01-30 PROCEDURE — 99285 EMERGENCY DEPT VISIT HI MDM: CPT | Mod: 25

## 2019-01-30 PROCEDURE — 71046 X-RAY EXAM CHEST 2 VIEWS: CPT | Mod: 26

## 2019-01-30 RX ORDER — KETOROLAC TROMETHAMINE 30 MG/ML
30 SYRINGE (ML) INJECTION ONCE
Qty: 0 | Refills: 0 | Status: DISCONTINUED | OUTPATIENT
Start: 2019-01-30 | End: 2019-01-30

## 2019-01-30 RX ORDER — SODIUM CHLORIDE 9 MG/ML
1000 INJECTION INTRAMUSCULAR; INTRAVENOUS; SUBCUTANEOUS
Qty: 0 | Refills: 0 | Status: DISCONTINUED | OUTPATIENT
Start: 2019-01-30 | End: 2019-02-03

## 2019-01-30 RX ORDER — ASPIRIN/CALCIUM CARB/MAGNESIUM 324 MG
81 TABLET ORAL ONCE
Qty: 0 | Refills: 0 | Status: COMPLETED | OUTPATIENT
Start: 2019-01-30 | End: 2019-01-30

## 2019-01-30 RX ADMIN — Medication 30 MILLIGRAM(S): at 21:41

## 2019-01-30 RX ADMIN — Medication 81 MILLIGRAM(S): at 20:15

## 2019-01-30 NOTE — ED PROVIDER NOTE - CARDIAC, MLM
Normal rate, regular rhythm.  Heart sounds S1, S2.  No murmurs, rubs or gallops. chest-Lt reproducible CP

## 2019-01-30 NOTE — ED PROVIDER NOTE - OBJECTIVE STATEMENT
43 y.o. female LMP 3 weeks ago, pt c/o Lt sided chest pain, "something pushing", also pinching pain on & off for 5 days, nonradiating, no sob, 43 y.o. female LMP 3 weeks ago, pt c/o Lt sided chest pain, "something pushing", also pinching pain on & off for 5 days, nonradiating, palpitations, lightheadedness, no sob, recent travelling, not on BCP, coughing, fever, pt with dysuria, urinary frequency, vaginal itchiness with discharge

## 2019-01-31 PROCEDURE — 82550 ASSAY OF CK (CPK): CPT

## 2019-01-31 PROCEDURE — 36415 COLL VENOUS BLD VENIPUNCTURE: CPT

## 2019-01-31 PROCEDURE — 81025 URINE PREGNANCY TEST: CPT

## 2019-01-31 PROCEDURE — 93005 ELECTROCARDIOGRAM TRACING: CPT

## 2019-01-31 PROCEDURE — 71046 X-RAY EXAM CHEST 2 VIEWS: CPT

## 2019-01-31 PROCEDURE — 99284 EMERGENCY DEPT VISIT MOD MDM: CPT | Mod: 25

## 2019-01-31 PROCEDURE — 85730 THROMBOPLASTIN TIME PARTIAL: CPT

## 2019-01-31 PROCEDURE — 96374 THER/PROPH/DIAG INJ IV PUSH: CPT

## 2019-01-31 PROCEDURE — 80053 COMPREHEN METABOLIC PANEL: CPT

## 2019-01-31 PROCEDURE — 81001 URINALYSIS AUTO W/SCOPE: CPT

## 2019-01-31 PROCEDURE — 85610 PROTHROMBIN TIME: CPT

## 2019-01-31 PROCEDURE — 85027 COMPLETE CBC AUTOMATED: CPT

## 2019-01-31 PROCEDURE — 84484 ASSAY OF TROPONIN QUANT: CPT

## 2019-01-31 RX ORDER — FLUCONAZOLE 150 MG/1
1 TABLET ORAL
Qty: 1 | Refills: 0
Start: 2019-01-31

## 2019-01-31 RX ORDER — FLUCONAZOLE 150 MG/1
150 TABLET ORAL ONCE
Qty: 0 | Refills: 0 | Status: COMPLETED | OUTPATIENT
Start: 2019-01-31 | End: 2019-01-31

## 2019-01-31 RX ADMIN — FLUCONAZOLE 150 MILLIGRAM(S): 150 TABLET ORAL at 00:43

## 2019-01-31 NOTE — ED ADULT NURSE NOTE - NEURO WDL
Alert and oriented to person, place and time, memory intact, behavior appropriate to situation, PERRL.
2

## 2019-01-31 NOTE — ED ADULT NURSE NOTE - OBJECTIVE STATEMENT
43 y.o. female LMP 3 weeks ago, pt c/o Lt sided chest pain, "something pushing", also pinching pain on & off for 5 days, nonradiating, palpitations, lightheadedness, no sob, recent travelling, not on BCP, coughing, fever, pt with dysuria, urinary frequency, vaginal itchiness with discharge

## 2019-01-31 NOTE — ED ADULT NURSE NOTE - NSIMPLEMENTINTERV_GEN_ALL_ED
Implemented All Universal Safety Interventions:  [place] to call system. Call bell, personal items and telephone within reach. Instruct patient to call for assistance. Room bathroom lighting operational. Non-slip footwear when patient is off stretcher. Physically safe environment: no spills, clutter or unnecessary equipment. Stretcher in lowest position, wheels locked, appropriate side rails in place.

## 2020-01-10 NOTE — ED PROVIDER NOTE - PSH
Yes H/O gastric bypass  2014 gastrostomy, lysis of adhesions  Perforated viscus  exploratory drainage of intraabdominal abcess and removal of mesh ring from around gastric pouch on 8/3/2014  S/P  section  x 2  S/P cholecystectomy    S/P vein stripping  right leg

## 2021-05-10 NOTE — ED ADULT NURSE NOTE - NS ED PATIENT SAFETY CONCERN
The patient is seen for follow-up of her right ankle and her left femur.  She had a fall about 8 weeks ago and sustained a nondisplaced right distal fibula fracture, a nondisplaced proximal 5th metatarsal fracture, and a comminuted intertrochanteric/subtrochanteric femur fracture which was treated with intramedullary fixation.  She is doing quite well.  She has been working with outpatient therapy.  She is ambulating well with a walker.  She denies ankle or foot pain.  She has minimal pain in her left hip area.  She is using a walker today but has used a cane in her therapy sessions.    Exam:  Right ankle exam reveals excellent active range of motion.  There is no tenderness over the distal fibula or the base of the 5th metatarsal.  There is no significant swelling.  Left hip exam reveals no pain with active and passive range of motion.  There is no tenderness in the area of the surgical scars.  Her leg is not unusually swollen.  She does not have any swelling in her foot or ankle.    X-ray of the right ankle demonstrates solid healing of the distal fibula fracture in anatomic alignment.  The ankle mortise is anatomically aligned.  X-rays of the left femur show progressive healing of the intertrochanteric/subtrochanteric femur fracture in excellent alignment.  There has been no change in fracture position or hardware compared to the prior films.    Assessment and plan:  Very nicely healing right ankle fracture, right foot 5th metatarsal fracture, and left subtrochanteric femur fracture.  She may discontinue the pneumatic walking boot the right lower extremity and perform normal activities with the right ankle.  She may transition off of the walker to a cane and eventually to no assistive devices as her left femur fracture recovery allows.  She will continue working with therapy.  I would like to see her again in 6 weeks at which time we will obtain new x-rays of the left femur.  
No

## 2022-04-25 ENCOUNTER — INPATIENT (INPATIENT)
Facility: HOSPITAL | Age: 47
LOS: 3 days | Discharge: HOME | DRG: 393 | End: 2022-04-29
Attending: SURGERY | Admitting: SURGERY
Payer: COMMERCIAL

## 2022-04-25 VITALS
WEIGHT: 160.94 LBS | HEART RATE: 82 BPM | RESPIRATION RATE: 16 BRPM | OXYGEN SATURATION: 97 % | DIASTOLIC BLOOD PRESSURE: 90 MMHG | HEIGHT: 65 IN | TEMPERATURE: 98 F | SYSTOLIC BLOOD PRESSURE: 135 MMHG

## 2022-04-25 DIAGNOSIS — Z98.89 OTHER SPECIFIED POSTPROCEDURAL STATES: Chronic | ICD-10-CM

## 2022-04-25 DIAGNOSIS — Z90.49 ACQUIRED ABSENCE OF OTHER SPECIFIED PARTS OF DIGESTIVE TRACT: Chronic | ICD-10-CM

## 2022-04-25 DIAGNOSIS — R69 ILLNESS, UNSPECIFIED: Chronic | ICD-10-CM

## 2022-04-25 DIAGNOSIS — Z98.84 BARIATRIC SURGERY STATUS: Chronic | ICD-10-CM

## 2022-04-25 LAB
ALBUMIN SERPL ELPH-MCNC: 4.3 G/DL — SIGNIFICANT CHANGE UP (ref 3.5–5)
ALP SERPL-CCNC: 102 U/L — SIGNIFICANT CHANGE UP (ref 40–120)
ALT FLD-CCNC: 38 U/L DA — SIGNIFICANT CHANGE UP (ref 10–60)
ANION GAP SERPL CALC-SCNC: 5 MMOL/L — SIGNIFICANT CHANGE UP (ref 5–17)
AST SERPL-CCNC: 53 U/L — HIGH (ref 10–40)
BASOPHILS # BLD AUTO: 0.08 K/UL — SIGNIFICANT CHANGE UP (ref 0–0.2)
BASOPHILS NFR BLD AUTO: 1 % — SIGNIFICANT CHANGE UP (ref 0–2)
BILIRUB SERPL-MCNC: 1.3 MG/DL — HIGH (ref 0.2–1.2)
BUN SERPL-MCNC: 21 MG/DL — HIGH (ref 7–18)
CALCIUM SERPL-MCNC: 9.7 MG/DL — SIGNIFICANT CHANGE UP (ref 8.4–10.5)
CHLORIDE SERPL-SCNC: 103 MMOL/L — SIGNIFICANT CHANGE UP (ref 96–108)
CO2 SERPL-SCNC: 31 MMOL/L — SIGNIFICANT CHANGE UP (ref 22–31)
CREAT SERPL-MCNC: 0.73 MG/DL — SIGNIFICANT CHANGE UP (ref 0.5–1.3)
EGFR: 103 ML/MIN/1.73M2 — SIGNIFICANT CHANGE UP
EOSINOPHIL # BLD AUTO: 0.05 K/UL — SIGNIFICANT CHANGE UP (ref 0–0.5)
EOSINOPHIL NFR BLD AUTO: 0.6 % — SIGNIFICANT CHANGE UP (ref 0–6)
GLUCOSE SERPL-MCNC: 89 MG/DL — SIGNIFICANT CHANGE UP (ref 70–99)
HCT VFR BLD CALC: 39.3 % — SIGNIFICANT CHANGE UP (ref 34.5–45)
HGB BLD-MCNC: 12.6 G/DL — SIGNIFICANT CHANGE UP (ref 11.5–15.5)
IMM GRANULOCYTES NFR BLD AUTO: 0.1 % — SIGNIFICANT CHANGE UP (ref 0–1.5)
LYMPHOCYTES # BLD AUTO: 2.73 K/UL — SIGNIFICANT CHANGE UP (ref 1–3.3)
LYMPHOCYTES # BLD AUTO: 33.4 % — SIGNIFICANT CHANGE UP (ref 13–44)
MCHC RBC-ENTMCNC: 31.7 PG — SIGNIFICANT CHANGE UP (ref 27–34)
MCHC RBC-ENTMCNC: 32.1 GM/DL — SIGNIFICANT CHANGE UP (ref 32–36)
MCV RBC AUTO: 98.7 FL — SIGNIFICANT CHANGE UP (ref 80–100)
MONOCYTES # BLD AUTO: 0.6 K/UL — SIGNIFICANT CHANGE UP (ref 0–0.9)
MONOCYTES NFR BLD AUTO: 7.3 % — SIGNIFICANT CHANGE UP (ref 2–14)
NEUTROPHILS # BLD AUTO: 4.7 K/UL — SIGNIFICANT CHANGE UP (ref 1.8–7.4)
NEUTROPHILS NFR BLD AUTO: 57.6 % — SIGNIFICANT CHANGE UP (ref 43–77)
NRBC # BLD: 0 /100 WBCS — SIGNIFICANT CHANGE UP (ref 0–0)
PLATELET # BLD AUTO: 347 K/UL — SIGNIFICANT CHANGE UP (ref 150–400)
POTASSIUM SERPL-MCNC: 4.9 MMOL/L — SIGNIFICANT CHANGE UP (ref 3.5–5.3)
POTASSIUM SERPL-SCNC: 4.9 MMOL/L — SIGNIFICANT CHANGE UP (ref 3.5–5.3)
PROT SERPL-MCNC: 7.8 G/DL — SIGNIFICANT CHANGE UP (ref 6–8.3)
RBC # BLD: 3.98 M/UL — SIGNIFICANT CHANGE UP (ref 3.8–5.2)
RBC # FLD: 13.2 % — SIGNIFICANT CHANGE UP (ref 10.3–14.5)
SODIUM SERPL-SCNC: 139 MMOL/L — SIGNIFICANT CHANGE UP (ref 135–145)
TROPONIN I, HIGH SENSITIVITY RESULT: <3 NG/L — SIGNIFICANT CHANGE UP
WBC # BLD: 8.17 K/UL — SIGNIFICANT CHANGE UP (ref 3.8–10.5)
WBC # FLD AUTO: 8.17 K/UL — SIGNIFICANT CHANGE UP (ref 3.8–10.5)

## 2022-04-25 PROCEDURE — 74177 CT ABD & PELVIS W/CONTRAST: CPT | Mod: 26,MA

## 2022-04-25 PROCEDURE — 99284 EMERGENCY DEPT VISIT MOD MDM: CPT

## 2022-04-25 PROCEDURE — 71046 X-RAY EXAM CHEST 2 VIEWS: CPT | Mod: 26

## 2022-04-25 RX ORDER — KETOROLAC TROMETHAMINE 30 MG/ML
15 SYRINGE (ML) INJECTION ONCE
Refills: 0 | Status: DISCONTINUED | OUTPATIENT
Start: 2022-04-25 | End: 2022-04-25

## 2022-04-25 RX ORDER — FAMOTIDINE 10 MG/ML
20 INJECTION INTRAVENOUS ONCE
Refills: 0 | Status: COMPLETED | OUTPATIENT
Start: 2022-04-25 | End: 2022-04-25

## 2022-04-25 RX ADMIN — Medication 15 MILLIGRAM(S): at 21:28

## 2022-04-25 RX ADMIN — Medication 30 MILLILITER(S): at 19:31

## 2022-04-25 RX ADMIN — Medication 15 MILLIGRAM(S): at 22:15

## 2022-04-25 RX ADMIN — FAMOTIDINE 20 MILLIGRAM(S): 10 INJECTION INTRAVENOUS at 19:39

## 2022-04-25 NOTE — ED PROVIDER NOTE - CLINICAL SUMMARY MEDICAL DECISION MAKING FREE TEXT BOX
46 yr old female with hx of gastric bypass 6 yrs ago presents to ed c/o anterior chest pain worse with lifting htings x 1 month lasting a few mins. + decrease po, epigastric pain. no fever, no chills, no sob, no cough, no palpitations, no leg swelling,  no n/v/d, no dysuria, no rashes. tried motrin without relieve. father mi under 65. no drugs, mild alcohol use. normal BM    likely gastritis r.o atypical acs. no gi sx otherwise to suggest sbo or infection. labs, cxr, gi cocktail, re-assess. PERC neg

## 2022-04-25 NOTE — ED PROVIDER NOTE - PROGRESS NOTE DETAILS
braun: work up no acute findings- pt still c/o abd pain after gi cocktail- toradol ordered and ct. no n/v, Joshua RAMIREZ: Received sign out from Dr. IAN Rodriguez.  CT reported Mildly distended JJ anastomosis containing fecalized material. Findings   could reflect fecalization secondary to slow transit vs. possibly bezoar.   Intussusception is considered less likely. No evidence of obstruction at   this time.   Surgical HO endorsed and will evaluate pt. Pt evaluated by surgical HO, informed to admit Dr. Gonsalez's service.  I had a detailed discussion with the patient and/or guardian regarding the historical points, exam findings, and any diagnostic results supporting the admit diagnosis.

## 2022-04-25 NOTE — ED ADULT NURSE NOTE - EXTENSIONS OF SELF_ADULT
Goals and plans for this visit:    Will follow up pulmonary medicine for pulmonary nodule and ? Mosaic attenuation of lung  Call [place] Referral line to make an appointment at [phone]     Left shoulder pain   Follow up SERVICE TO PHYSICAL THERAPY  Call [place] Referral line to make an appointment at [phone]        Lt arm discomfort/chest pain None

## 2022-04-25 NOTE — ED PROVIDER NOTE - OBJECTIVE STATEMENT
46 yr old female with hx of gastric bypass 6 yrs ago presents to ed c/o anterior chest pain worse with lifting htings x 1 month lasting a few mins. + decrease po, epigastric pain. no fever, no chills, no sob, no cough, no palpitations, no leg swelling,  no n/v/d, no dysuria, no rashes. tried motrin without relieve. father mi under 65. no drugs, mild alcohol use. normal BM

## 2022-04-26 DIAGNOSIS — R10.9 UNSPECIFIED ABDOMINAL PAIN: ICD-10-CM

## 2022-04-26 LAB
ANION GAP SERPL CALC-SCNC: 5 MMOL/L — SIGNIFICANT CHANGE UP (ref 5–17)
BASOPHILS # BLD AUTO: 0.08 K/UL — SIGNIFICANT CHANGE UP (ref 0–0.2)
BASOPHILS NFR BLD AUTO: 1.2 % — SIGNIFICANT CHANGE UP (ref 0–2)
BUN SERPL-MCNC: 18 MG/DL — SIGNIFICANT CHANGE UP (ref 7–18)
CALCIUM SERPL-MCNC: 9.4 MG/DL — SIGNIFICANT CHANGE UP (ref 8.4–10.5)
CHLORIDE SERPL-SCNC: 106 MMOL/L — SIGNIFICANT CHANGE UP (ref 96–108)
CO2 SERPL-SCNC: 30 MMOL/L — SIGNIFICANT CHANGE UP (ref 22–31)
CREAT SERPL-MCNC: 0.59 MG/DL — SIGNIFICANT CHANGE UP (ref 0.5–1.3)
EGFR: 113 ML/MIN/1.73M2 — SIGNIFICANT CHANGE UP
EOSINOPHIL # BLD AUTO: 0.07 K/UL — SIGNIFICANT CHANGE UP (ref 0–0.5)
EOSINOPHIL NFR BLD AUTO: 1.1 % — SIGNIFICANT CHANGE UP (ref 0–6)
GLUCOSE SERPL-MCNC: 84 MG/DL — SIGNIFICANT CHANGE UP (ref 70–99)
HCT VFR BLD CALC: 36.8 % — SIGNIFICANT CHANGE UP (ref 34.5–45)
HGB BLD-MCNC: 11.8 G/DL — SIGNIFICANT CHANGE UP (ref 11.5–15.5)
IMM GRANULOCYTES NFR BLD AUTO: 0.2 % — SIGNIFICANT CHANGE UP (ref 0–1.5)
LYMPHOCYTES # BLD AUTO: 2.77 K/UL — SIGNIFICANT CHANGE UP (ref 1–3.3)
LYMPHOCYTES # BLD AUTO: 42 % — SIGNIFICANT CHANGE UP (ref 13–44)
MCHC RBC-ENTMCNC: 31.7 PG — SIGNIFICANT CHANGE UP (ref 27–34)
MCHC RBC-ENTMCNC: 32.1 GM/DL — SIGNIFICANT CHANGE UP (ref 32–36)
MCV RBC AUTO: 98.9 FL — SIGNIFICANT CHANGE UP (ref 80–100)
MONOCYTES # BLD AUTO: 0.52 K/UL — SIGNIFICANT CHANGE UP (ref 0–0.9)
MONOCYTES NFR BLD AUTO: 7.9 % — SIGNIFICANT CHANGE UP (ref 2–14)
NEUTROPHILS # BLD AUTO: 3.15 K/UL — SIGNIFICANT CHANGE UP (ref 1.8–7.4)
NEUTROPHILS NFR BLD AUTO: 47.6 % — SIGNIFICANT CHANGE UP (ref 43–77)
NRBC # BLD: 0 /100 WBCS — SIGNIFICANT CHANGE UP (ref 0–0)
PLATELET # BLD AUTO: 317 K/UL — SIGNIFICANT CHANGE UP (ref 150–400)
POTASSIUM SERPL-MCNC: 4.8 MMOL/L — SIGNIFICANT CHANGE UP (ref 3.5–5.3)
POTASSIUM SERPL-SCNC: 4.8 MMOL/L — SIGNIFICANT CHANGE UP (ref 3.5–5.3)
RBC # BLD: 3.72 M/UL — LOW (ref 3.8–5.2)
RBC # FLD: 13.1 % — SIGNIFICANT CHANGE UP (ref 10.3–14.5)
SARS-COV-2 RNA SPEC QL NAA+PROBE: SIGNIFICANT CHANGE UP
SODIUM SERPL-SCNC: 141 MMOL/L — SIGNIFICANT CHANGE UP (ref 135–145)
WBC # BLD: 6.6 K/UL — SIGNIFICANT CHANGE UP (ref 3.8–10.5)
WBC # FLD AUTO: 6.6 K/UL — SIGNIFICANT CHANGE UP (ref 3.8–10.5)

## 2022-04-26 PROCEDURE — 99221 1ST HOSP IP/OBS SF/LOW 40: CPT

## 2022-04-26 RX ORDER — ONDANSETRON 8 MG/1
4 TABLET, FILM COATED ORAL EVERY 6 HOURS
Refills: 0 | Status: DISCONTINUED | OUTPATIENT
Start: 2022-04-26 | End: 2022-04-29

## 2022-04-26 RX ORDER — PANTOPRAZOLE SODIUM 20 MG/1
40 TABLET, DELAYED RELEASE ORAL DAILY
Refills: 0 | Status: DISCONTINUED | OUTPATIENT
Start: 2022-04-26 | End: 2022-04-29

## 2022-04-26 RX ORDER — HEPARIN SODIUM 5000 [USP'U]/ML
5000 INJECTION INTRAVENOUS; SUBCUTANEOUS EVERY 8 HOURS
Refills: 0 | Status: DISCONTINUED | OUTPATIENT
Start: 2022-04-26 | End: 2022-04-29

## 2022-04-26 RX ORDER — KETOROLAC TROMETHAMINE 30 MG/ML
30 SYRINGE (ML) INJECTION EVERY 6 HOURS
Refills: 0 | Status: DISCONTINUED | OUTPATIENT
Start: 2022-04-26 | End: 2022-04-28

## 2022-04-26 RX ORDER — MORPHINE SULFATE 50 MG/1
4 CAPSULE, EXTENDED RELEASE ORAL ONCE
Refills: 0 | Status: DISCONTINUED | OUTPATIENT
Start: 2022-04-26 | End: 2022-04-26

## 2022-04-26 RX ORDER — ACETAMINOPHEN 500 MG
1000 TABLET ORAL ONCE
Refills: 0 | Status: COMPLETED | OUTPATIENT
Start: 2022-04-26 | End: 2022-04-26

## 2022-04-26 RX ORDER — SODIUM CHLORIDE 9 MG/ML
1000 INJECTION, SOLUTION INTRAVENOUS
Refills: 0 | Status: DISCONTINUED | OUTPATIENT
Start: 2022-04-26 | End: 2022-04-29

## 2022-04-26 RX ADMIN — SODIUM CHLORIDE 110 MILLILITER(S): 9 INJECTION, SOLUTION INTRAVENOUS at 17:36

## 2022-04-26 RX ADMIN — SODIUM CHLORIDE 110 MILLILITER(S): 9 INJECTION, SOLUTION INTRAVENOUS at 23:53

## 2022-04-26 RX ADMIN — HEPARIN SODIUM 5000 UNIT(S): 5000 INJECTION INTRAVENOUS; SUBCUTANEOUS at 14:19

## 2022-04-26 RX ADMIN — Medication 30 MILLIGRAM(S): at 21:48

## 2022-04-26 RX ADMIN — Medication 30 MILLIGRAM(S): at 22:10

## 2022-04-26 RX ADMIN — Medication 400 MILLIGRAM(S): at 11:47

## 2022-04-26 RX ADMIN — SODIUM CHLORIDE 110 MILLILITER(S): 9 INJECTION, SOLUTION INTRAVENOUS at 11:48

## 2022-04-26 RX ADMIN — HEPARIN SODIUM 5000 UNIT(S): 5000 INJECTION INTRAVENOUS; SUBCUTANEOUS at 05:35

## 2022-04-26 RX ADMIN — PANTOPRAZOLE SODIUM 40 MILLIGRAM(S): 20 TABLET, DELAYED RELEASE ORAL at 11:48

## 2022-04-26 RX ADMIN — SODIUM CHLORIDE 110 MILLILITER(S): 9 INJECTION, SOLUTION INTRAVENOUS at 06:09

## 2022-04-26 RX ADMIN — MORPHINE SULFATE 4 MILLIGRAM(S): 50 CAPSULE, EXTENDED RELEASE ORAL at 01:43

## 2022-04-26 RX ADMIN — Medication 400 MILLIGRAM(S): at 17:35

## 2022-04-26 RX ADMIN — MORPHINE SULFATE 4 MILLIGRAM(S): 50 CAPSULE, EXTENDED RELEASE ORAL at 02:50

## 2022-04-26 RX ADMIN — HEPARIN SODIUM 5000 UNIT(S): 5000 INJECTION INTRAVENOUS; SUBCUTANEOUS at 21:28

## 2022-04-26 RX ADMIN — Medication 1000 MILLIGRAM(S): at 12:34

## 2022-04-26 NOTE — PROGRESS NOTE ADULT - SUBJECTIVE AND OBJECTIVE BOX
INTERVAL HPI/OVERNIGHT EVENTS:  Pt seen and examined at bedside   Pt resting comfortably. Some periumbilical abdominal pain   NPO  +flatus/BM yesterday.   Denies N/V    MEDICATIONS  (STANDING):  dextrose 5% + sodium chloride 0.9%. 1000 milliLiter(s) (110 mL/Hr) IV Continuous <Continuous>  heparin   Injectable 5000 Unit(s) SubCutaneous every 8 hours  pantoprazole  Injectable 40 milliGRAM(s) IV Push daily    MEDICATIONS  (PRN):  ondansetron Injectable 4 milliGRAM(s) IV Push every 6 hours PRN Nausea      Vital Signs Last 24 Hrs  T(C): 36.6 (26 Apr 2022 07:20), Max: 36.8 (25 Apr 2022 20:58)  T(F): 97.9 (26 Apr 2022 07:20), Max: 98.2 (25 Apr 2022 20:58)  HR: 80 (26 Apr 2022 07:20) (58 - 95)  BP: 161/90 (26 Apr 2022 07:20) (119/77 - 161/90)  BP(mean): --  RR: 18 (26 Apr 2022 07:20) (16 - 18)  SpO2: 98% (26 Apr 2022 07:20) (96% - 98%)    Physical:  General: A&Ox3. NAD.  Respirations: Unlabored   Abdomen: Soft nondistended, nontender. No rebound, no guarding, no peritonitis     I&O's Detail      LABS:                        11.8   6.60  )-----------( 317      ( 26 Apr 2022 05:48 )             36.8             04-26    141  |  106  |  18  ----------------------------<  84  4.8   |  30  |  0.59    Ca    9.4      26 Apr 2022 05:48    TPro  7.8  /  Alb  4.3  /  TBili  1.3<H>  /  DBili  x   /  AST  53<H>  /  ALT  38  /  AlkPhos  102  04-25    < from: CT Abdomen and Pelvis w/ IV Cont (04.25.22 @ 23:30) >    ACC: [id number] EXAM:  CT ABDOMEN AND PELVIS IC                          PROCEDURE DATE:  04/25/2022          INTERPRETATION:  CLINICAL INFORMATION: Abdominal pain.    COMPARISON: 10/2/2018    CONTRAST/COMPLICATIONS:  IV Contrast: Omnipaque 350  90 cc administered   10 cc discarded  Oral Contrast: NONE  Complications: None reported at time of study completion    PROCEDURE:  CT of the Abdomen and Pelvis was performed.  Sagittal and coronal reformats were performed.    FINDINGS:  LOWER CHEST: Mild bibasilar linear atelectasis.    LIVER: Within normal limits.  BILE DUCTS: Normal caliber.  GALLBLADDER: Cholecystectomy.  SPLEEN: Within normal limits.  PANCREAS: Within normal limits.  ADRENALS: Within normal limits.  KIDNEYS/URETERS: No hydronephrosis. Homogeneous renal enhancement.   Bilateral renal hypodensities too small to characterize.    BLADDER: Within normal limits.  REPRODUCTIVE ORGANS: Uterus and adnexa within normal limits.    BOWEL: No bowel obstruction. Appendix is not visualized. No evidence of   inflammation in the pericecal region. Status post Shahid-en-Y gastric   bypass. Mildly distended JJ anastomosis containing fecalized material,   measuring up to 4.3 x 3.4 cm (2, 63).  PERITONEUM: Trace free fluid adjacent to the right ovary.  VESSELS: Within normal limits.  RETROPERITONEUM/LYMPH NODES: No lymphadenopathy.  ABDOMINAL WALL: Tiny fat-containing umbilical hernia. Postsurgical   changes.  BONES: Mild degenerative changes. Stable 7 mm sclerotic lesion and 9 mm   lucent lesion in the left iliac bone, nonspecific.    IMPRESSION:  Mildly distended JJ anastomosis containing fecalized material. Findings   could reflect fecalization secondary to slow transit vs. possibly bezoar.   Intussusception is considered less likely. No evidence of obstruction at   this time. Follow-up with oral contrast can be obtained for further   evaluation.        --- End of Report ---            PERNELL WEBB MD; Attending Radiologist  This document has been electronically signed. Apr 26 2022 12:12AM    < end of copied text >

## 2022-04-26 NOTE — CONSULT NOTE ADULT - PROBLEM SELECTOR RECOMMENDATION 9
likely due to likely due to debris seen in JJ anastomosis  NPO  IVF  Pain control  Anti-emetics  PPI likely due to debris seen in JJ anastomosis vs enteritis , unlikely bowel obstruction   Pt symptoms improving  Clear liquid diet  Pain control  Anti-emetics  PPI  CT enterography with close examination at JJ anastamosis and compare with prior CT .   Stool for O&P  GI PCR   Iron panel , B12 and  folate levels likely due to debris seen in JJ anastomosis vs enteritis , unlikely bowel obstruction   Pt symptoms improving  Clear liquid diet  Pain control  Anti-emetics  PPI  CT enterography with close examination at JJ anastamosis and compare with prior CT .   Stool for O&P  GI PCR   Iron panel , B12 and  folate levels    D/w Surgical team.

## 2022-04-26 NOTE — H&P ADULT - HISTORY OF PRESENT ILLNESS
46F with no sig PMH, PSH Shahid-en-Y gastric bypass 7/2014 (120lb weight loss after surgery), ex-lap, drainage of intraabdominal abscess, removal of mesh ring around gastric pouch 8/2014, incisional hernia repair 11/2015, abd wall mass excision 4/2016 presents c/o abdominal pain x 1 week.  Pt also c/o left sided chest pain for 2-3 days that worsens with touch and affects left shoulder when using shoulder bag. Pt states abdominal pain is in lower abdomen and worsened with eating.  Pt states she lost 9lb from inability to eat solid foods.  Pt admits to flatus and BM. She denies nausea, vomiting, fever or chills.

## 2022-04-26 NOTE — H&P ADULT - NSICDXPASTSURGICALHX_GEN_ALL_CORE_FT
PAST SURGICAL HISTORY:  H/O gastric bypass 2014 gastrostomy, lysis of adhesions    Perforated viscus exploratory drainage of intraabdominal abcess and removal of mesh ring from around gastric pouch on 8/3/2014    S/P  section x 2    S/P cholecystectomy     S/P vein stripping right leg

## 2022-04-26 NOTE — PATIENT PROFILE ADULT - OVER THE PAST TWO WEEKS, HAVE YOU FELT LITTLE INTEREST OR PLEASURE IN DOING THINGS?
No pushing, pulling, tugging,  heavy lifting, or strenuous activity.  No major decision making, driving, or drinking alcoholic beverages for 24 hours. ( due to the medications you have  received)  Always use good hand hygiene/washing techniques.  NO driving while taking pain medications.    * if you have an incision:  Check your incision area every day for signs of infection.   Check for:  * more redness, swelling, or pain  *more fluid or blood  *warmth  *pus or bad smell.    To assist you in voiding:  Drink plenty of fluids  Listen to running water while attempting to void.    If you are unable to urinate and you have an uncomfortable urge to void or it has been   6 hours since you were discharged, return to the Emergency Room.    Pelvic rest is best described as not putting anything in your vagina. This includes tampons, douching, tub bathing or sexual activity.      no

## 2022-04-26 NOTE — CONSULT NOTE ADULT - ASSESSMENT
46F with no sig PMH, PSH Shahid-en-Y gastric bypass 7/2014 (120lb weight loss after surgery), ex-lap, drainage of intraabdominal abscess, removal of mesh ring around gastric pouch 8/2014, incisional hernia repair 11/2015, abd wall mass excision 4/2016 presents c/o abdominal pain x 1 week. . CT abd/pelvis with IV contrasts shows mldly distended JJ anastomosis containing fecalized material. GI consulted for further evaluation .

## 2022-04-26 NOTE — PATIENT PROFILE ADULT - FALL HARM RISK - UNIVERSAL INTERVENTIONS
Bed in lowest position, wheels locked, appropriate side rails in place/Call bell, personal items and telephone in reach/Instruct patient to call for assistance before getting out of bed or chair/Non-slip footwear when patient is out of bed/[place] to call system/Physically safe environment - no spills, clutter or unnecessary equipment/Purposeful Proactive Rounding/Room/bathroom lighting operational, light cord in reach

## 2022-04-26 NOTE — H&P ADULT - NSICDXPASTMEDICALHX_GEN_ALL_CORE_FT
PAST MEDICAL HISTORY:  Anxiety     Depression     GERD (gastroesophageal reflux disease)     Narcolepsy     Obesity     JEFFREY (obstructive sleep apnea) resolved after gastric bypass    Renal stone     Scoliosis of lumbar spine

## 2022-04-26 NOTE — PROGRESS NOTE ADULT - NS PANP COMMENT GEN_ALL_CORE FT
Patient seen and examined by me and discussed with the surgical team.   I have reviewed and agree with the documented findings and plan of care, with any exceptions noted.  Plan as above.     Patient's questions and concerns addressed to patient's satisfaction.

## 2022-04-26 NOTE — CONSULT NOTE ADULT - SUBJECTIVE AND OBJECTIVE BOX
[facility]    Patient is a 46y old  Female who presents with a chief complaint of abdominal pain (2022 09:47)    HPI:  46F with no sig PMH, PSH Shahid-en-Y gastric bypass 2014 (120lb weight loss after surgery) in [facility], ex-lap, drainage of intraabdominal abscess, removal of mesh ring around gastric pouch 2014, incisional hernia repair 2015, abd wall mass excision 2016 presents c/o abdominal pain x 1 week.  Pt also c/o left sided chest pain for 2-3 days that worsens with touch and affects left shoulder when using shoulder bag. Pt states abdominal pain is in lower abdomen and worsened with eating.  Pt states she lost 9lb from inability to eat solid foods.  Pt admits to flatus and BM. She denies nausea, vomiting, fever or chills.  (2022 02:15)    GI HPI: Pt presented with chest pain  and abdominal pain x 1 week. CT abd/pelvis with IV contrasts shows mldly distended JJ anastomosis containing fecalized material. GI consulted for further evaluation . Pt endorses abdominal pain currently improved at 5/10. Pt denies fever, SOB, nausea/vomiting/ diarrhea/ constipation , hematochezia.   Last EGD/colonoscopy 4 yrs ago with Dr. Trevino in [place] . Pt reports normal findings.       PMH/PSH:    Home Meds: Supplements: B12, Vit D, Vit C, biotene, Advil 200mg 4x daily as needed for back pain , Zopiclona 7.5mg tablet prn insomnia   PAST MEDICAL & SURGICAL HISTORY:  Narcolepsy    GERD (gastroesophageal reflux disease)    Obesity    JEFFREY (obstructive sleep apnea)  resolved after gastric bypass    Depression    Anxiety    Scoliosis of lumbar spine    Renal stone    S/P  section  x 2    S/P cholecystectomy    S/P vein stripping  right leg    H/O gastric bypass  2014 gastrostomy, lysis of adhesions    Perforated viscus  exploratory drainage of intraabdominal abcess and removal of mesh ring from around gastric pouch on 8/3/2014      FH:  FAMILY HISTORY:  Father: Prostate cancer  Aunt: gastric cancer  Family history of hypertension (Grandparent)    Social history:   No smoking, no illicit drugs, no alcohol        MEDS:  MEDICATIONS  (STANDING):  dextrose 5% + sodium chloride 0.9%. 1000 milliLiter(s) (110 mL/Hr) IV Continuous <Continuous>  heparin   Injectable 5000 Unit(s) SubCutaneous every 8 hours  pantoprazole  Injectable 40 milliGRAM(s) IV Push daily    MEDICATIONS  (PRN):  ondansetron Injectable 4 milliGRAM(s) IV Push every 6 hours PRN Nausea    Allergies    No Known Allergies    Intolerances      CONSTITUTIONAL: (+) wt loss  , No fever, chills, weakness or fatigue.  HEENT:  Eyes:  No visual loss, blurred vision, double vision or yellow sclerae. Ears, Nose, Throat:  No hearing loss, sneezing, congestion, runny nose or sore throat.  SKIN:  No rash or itching.  CARDIOVASCULAR:  No chest pain, chest pressure or chest discomfort. No palpitations or edema.  RESPIRATORY:  No shortness of breath, cough or sputum.  GASTROINTESTINAL:  SEE HPI  GENITOURINARY:  No dysuria, hematuria, urinary frequency  NEUROLOGICAL:  No headache, dizziness, syncope, paralysis, ataxia, numbness or tingling in the extremities. No change in bowel or bladder control.  MUSCULOSKELETAL:  (+) back pain, No muscle, joint pain or stiffness.  HEMATOLOGIC:  No anemia, bleeding or bruising.  LYMPHATICS:  No enlarged nodes. No history of splenectomy.  PSYCHIATRIC: (+) insomnia,  No history of depression or anxiety.  ENDOCRINOLOGIC:  No reports of sweating, cold or heat intolerance. No polyuria or polydipsia.      ______________________________________________________________________  PHYSICAL EXAM:  T(C): 36.6 (22 @ 10:56), Max: 36.8 (22 @ 20:58)  HR: 76 (22 @ 10:56)  BP: 172/91 (22 @ 10:56)  RR: 17 (22 @ 10:56)  SpO2: 97% (22 @ 10:56)  Wt(kg): --      GEN: NAD, normocephalic  CVS: S1S2+  CHEST: clear to auscultation. left chest wall tender to touch  ABD: soft , nontender, nondistended, bowel sounds present  EXTR: no cyanosis, no clubbing, no edema  NEURO: Awake and alert; oriented x 3, Non-focal   SKIN:  warm;  non icteric    ______________________________________________________________________  LABS:                        11.8   6.60  )-----------( 317      ( 2022 05:48 )             36.8         141  |  106  |  18  ----------------------------<  84  4.8   |  30  |  0.59    Ca    9.4      2022 05:48    TPro  7.8  /  Alb  4.3  /  TBili  1.3<H>  /  DBili  x   /  AST  53<H>  /  ALT  38  /  AlkPhos  102      LIVER FUNCTIONS - ( 2022 19:34 )  Alb: 4.3 g/dL / Pro: 7.8 g/dL / ALK PHOS: 102 U/L / ALT: 38 U/L DA / AST: 53 U/L / GGT: x             ____________________________________________    IMAGING:    < from: CT Abdomen and Pelvis w/ IV Cont (22 @ 23:30) >  CONTRAST/COMPLICATIONS:  IV Contrast: Omnipaque 350  90 cc administered   10 cc discarded  Oral Contrast: NONE  Complications: None reported at time of study completion    PROCEDURE:  CT of the Abdomen and Pelvis was performed.  Sagittal and coronal reformats were performed.    FINDINGS:  LOWER CHEST: Mild bibasilar linear atelectasis.    LIVER: Within normal limits.  BILE DUCTS: Normal caliber.  GALLBLADDER: Cholecystectomy.  SPLEEN: Within normal limits.  PANCREAS: Within normal limits.  ADRENALS: Within normal limits.  KIDNEYS/URETERS: No hydronephrosis. Homogeneous renal enhancement.   Bilateral renal hypodensities too small to characterize.    BLADDER: Within normal limits.  REPRODUCTIVE ORGANS: Uterus and adnexa within normal limits.    BOWEL: No bowel obstruction. Appendix is not visualized. No evidence of   inflammation in the pericecal region. Status post Shahid-en-Y gastric   bypass. Mildly distended JJ anastomosis containing fecalized material,   measuring up to 4.3 x 3.4 cm (2, 63).  PERITONEUM: Trace free fluid adjacent to the right ovary.  VESSELS: Within normal limits.  RETROPERITONEUM/LYMPH NODES: No lymphadenopathy.  ABDOMINAL WALL: Tiny fat-containing umbilical hernia. Postsurgical   changes.  BONES: Mild degenerative changes. Stable 7 mm sclerotic lesion and 9 mm   lucent lesion in the left iliac bone, nonspecific.    IMPRESSION:  Mildly distended JJ anastomosis containing fecalized material. Findings   could reflect fecalization secondary to slow transit vs. possibly bezoar.   Intussusception is considered less likely. No evidence of obstruction at   this time. Follow-up with oral contrast can be obtained for further   evaluation.    < end of copied text >                [facility]    Patient is a 46y old  Female who presents with a chief complaint of abdominal pain (2022 09:47)    HPI:  46F with no sig PMH, PSH Shahid-en-Y gastric bypass 2014 (120lb weight loss after surgery) in [facility], ex-lap, drainage of intraabdominal abscess, removal of mesh ring around gastric pouch 2014, incisional hernia repair 2015, abd wall mass excision 2016 presents c/o abdominal pain x 1 week.  Pt also c/o left sided chest pain for 2-3 days that worsens with touch and affects left shoulder when using shoulder bag. Pt states abdominal pain is in lower abdomen and worsened with eating.  Pt states she lost 9lb from inability to eat solid foods.  Pt admits to flatus and BM. She denies nausea, vomiting, fever or chills.  (2022 02:15)    GI HPI: Pt presented with chest pain  and abdominal pain x 1 week. CT abd/pelvis with IV contrasts shows mldly distended JJ anastomosis containing fecalized material. GI consulted for further evaluation . Pt endorses abdominal pain currently improved at 5/10. Pt denies fever, SOB, nausea/vomiting/ diarrhea/ constipation , hematochezia.   Last EGD/colonoscopy 4 yrs ago with Dr. Trevino in [place] . Pt reports normal findings.   Upon further review, pt endorses eating lobster at restaurant .      PMH/PSH:    Home Meds: Supplements: B12, Vit D, Vit C, biotene, Advil 200mg 4x daily as needed for back pain , Zopiclona 7.5mg tablet prn insomnia   PAST MEDICAL & SURGICAL HISTORY:  Narcolepsy    GERD (gastroesophageal reflux disease)    Obesity    JEFFREY (obstructive sleep apnea)  resolved after gastric bypass    Depression    Anxiety    Scoliosis of lumbar spine    Renal stone    S/P  section  x 2    S/P cholecystectomy    S/P vein stripping  right leg    H/O gastric bypass  2014 gastrostomy, lysis of adhesions    Perforated viscus  exploratory drainage of intraabdominal abcess and removal of mesh ring from around gastric pouch on 8/3/2014      FH:  FAMILY HISTORY:  Father: Prostate cancer  Aunt: gastric cancer  Family history of hypertension (Grandparent)    Social history:   No smoking, no illicit drugs, no alcohol        MEDS:  MEDICATIONS  (STANDING):  dextrose 5% + sodium chloride 0.9%. 1000 milliLiter(s) (110 mL/Hr) IV Continuous <Continuous>  heparin   Injectable 5000 Unit(s) SubCutaneous every 8 hours  pantoprazole  Injectable 40 milliGRAM(s) IV Push daily    MEDICATIONS  (PRN):  ondansetron Injectable 4 milliGRAM(s) IV Push every 6 hours PRN Nausea    Allergies    No Known Allergies    Intolerances      CONSTITUTIONAL: (+) wt loss  , No fever, chills, weakness or fatigue.  HEENT:  Eyes:  No visual loss, blurred vision, double vision or yellow sclerae. Ears, Nose, Throat:  No hearing loss, sneezing, congestion, runny nose or sore throat.  SKIN:  No rash or itching.  CARDIOVASCULAR:  No chest pain, chest pressure or chest discomfort. No palpitations or edema.  RESPIRATORY:  No shortness of breath, cough or sputum.  GASTROINTESTINAL:  SEE HPI  GENITOURINARY:  No dysuria, hematuria, urinary frequency  NEUROLOGICAL:  No headache, dizziness, syncope, paralysis, ataxia, numbness or tingling in the extremities. No change in bowel or bladder control.  MUSCULOSKELETAL:  (+) back pain, No muscle, joint pain or stiffness.  HEMATOLOGIC:  No anemia, bleeding or bruising.  LYMPHATICS:  No enlarged nodes. No history of splenectomy.  PSYCHIATRIC: (+) insomnia,  No history of depression or anxiety.  ENDOCRINOLOGIC:  No reports of sweating, cold or heat intolerance. No polyuria or polydipsia.      ______________________________________________________________________  PHYSICAL EXAM:  T(C): 36.6 (22 @ 10:56), Max: 36.8 (22 @ 20:58)  HR: 76 (22 @ 10:56)  BP: 172/91 (22 @ 10:56)  RR: 17 (22 @ 10:56)  SpO2: 97% (22 @ 10:56)  Wt(kg): --      GEN: NAD, normocephalic  CVS: S1S2+  CHEST: clear to auscultation. left chest wall tender to touch  ABD: soft , nontender, nondistended, bowel sounds present  EXTR: no cyanosis, no clubbing, no edema  NEURO: Awake and alert; oriented x 3, Non-focal   SKIN:  warm;  non icteric    ______________________________________________________________________  LABS:                        11.8   6.60  )-----------( 317      ( 2022 05:48 )             36.8         141  |  106  |  18  ----------------------------<  84  4.8   |  30  |  0.59    Ca    9.4      2022 05:48    TPro  7.8  /  Alb  4.3  /  TBili  1.3<H>  /  DBili  x   /  AST  53<H>  /  ALT  38  /  AlkPhos  102      LIVER FUNCTIONS - ( 2022 19:34 )  Alb: 4.3 g/dL / Pro: 7.8 g/dL / ALK PHOS: 102 U/L / ALT: 38 U/L DA / AST: 53 U/L / GGT: x             ____________________________________________    IMAGING:    < from: CT Abdomen and Pelvis w/ IV Cont (22 @ 23:30) >  CONTRAST/COMPLICATIONS:  IV Contrast: Omnipaque 350  90 cc administered   10 cc discarded  Oral Contrast: NONE  Complications: None reported at time of study completion    PROCEDURE:  CT of the Abdomen and Pelvis was performed.  Sagittal and coronal reformats were performed.    FINDINGS:  LOWER CHEST: Mild bibasilar linear atelectasis.    LIVER: Within normal limits.  BILE DUCTS: Normal caliber.  GALLBLADDER: Cholecystectomy.  SPLEEN: Within normal limits.  PANCREAS: Within normal limits.  ADRENALS: Within normal limits.  KIDNEYS/URETERS: No hydronephrosis. Homogeneous renal enhancement.   Bilateral renal hypodensities too small to characterize.    BLADDER: Within normal limits.  REPRODUCTIVE ORGANS: Uterus and adnexa within normal limits.    BOWEL: No bowel obstruction. Appendix is not visualized. No evidence of   inflammation in the pericecal region. Status post Shahid-en-Y gastric   bypass. Mildly distended JJ anastomosis containing fecalized material,   measuring up to 4.3 x 3.4 cm (2, 63).  PERITONEUM: Trace free fluid adjacent to the right ovary.  VESSELS: Within normal limits.  RETROPERITONEUM/LYMPH NODES: No lymphadenopathy.  ABDOMINAL WALL: Tiny fat-containing umbilical hernia. Postsurgical   changes.  BONES: Mild degenerative changes. Stable 7 mm sclerotic lesion and 9 mm   lucent lesion in the left iliac bone, nonspecific.    IMPRESSION:  Mildly distended JJ anastomosis containing fecalized material. Findings   could reflect fecalization secondary to slow transit vs. possibly bezoar.   Intussusception is considered less likely. No evidence of obstruction at   this time. Follow-up with oral contrast can be obtained for further   evaluation.    < end of copied text >

## 2022-04-26 NOTE — H&P ADULT - NS ATTEND AMEND GEN_ALL_CORE FT
Patient seen and examined by me and discussed with the surgical team.   I have reviewed and agree with the documented findings and plan of care, with any exceptions noted.    Patient with complicated abdominal surgical history, now with several days of abdominal pain as noted above.   Patient states pain has subsided.    CT scan findings noted.   Minimal abdominal tenderness on exam, no rebound, guarding or peritonitis.     Will consult GI for possible endoscopy, depending on patient's progress.      Patient's questions and concerns addressed to patient's satisfaction.

## 2022-04-26 NOTE — H&P ADULT - ASSESSMENT
45yo F with gastric bypass 2012 with abdominal pain secondary to jejunojejunal anastomotic debris vs bezoar  1. Admit to surgery  2. NPO  3. IVF  4. Pain control  5. Anti-emetics, PPI  6. Possible OR later today 45yo F with gastric bypass 2014 with abdominal pain secondary to jejunojejunal anastomotic debris vs bezoar  1. Admit to surgery  2. NPO  3. IVF  4. Pain control  5. Anti-emetics, PPI  6. Possible OR later today 45yo F with gastric bypass 2014 with abdominal pain secondary to jejunojejunal anastomotic debris vs bezoar  1. Admit to surgery  2. NPO  3. IVF  4. Pain control  5. Anti-emetics, PPI

## 2022-04-27 LAB
ANION GAP SERPL CALC-SCNC: 5 MMOL/L — SIGNIFICANT CHANGE UP (ref 5–17)
BUN SERPL-MCNC: 10 MG/DL — SIGNIFICANT CHANGE UP (ref 7–18)
CALCIUM SERPL-MCNC: 9.3 MG/DL — SIGNIFICANT CHANGE UP (ref 8.4–10.5)
CHLORIDE SERPL-SCNC: 107 MMOL/L — SIGNIFICANT CHANGE UP (ref 96–108)
CO2 SERPL-SCNC: 29 MMOL/L — SIGNIFICANT CHANGE UP (ref 22–31)
CREAT SERPL-MCNC: 0.47 MG/DL — LOW (ref 0.5–1.3)
EGFR: 120 ML/MIN/1.73M2 — SIGNIFICANT CHANGE UP
GLUCOSE SERPL-MCNC: 97 MG/DL — SIGNIFICANT CHANGE UP (ref 70–99)
HCT VFR BLD CALC: 36.9 % — SIGNIFICANT CHANGE UP (ref 34.5–45)
HGB BLD-MCNC: 11.7 G/DL — SIGNIFICANT CHANGE UP (ref 11.5–15.5)
MCHC RBC-ENTMCNC: 31.7 GM/DL — LOW (ref 32–36)
MCHC RBC-ENTMCNC: 32 PG — SIGNIFICANT CHANGE UP (ref 27–34)
MCV RBC AUTO: 100.8 FL — HIGH (ref 80–100)
NRBC # BLD: 0 /100 WBCS — SIGNIFICANT CHANGE UP (ref 0–0)
PLATELET # BLD AUTO: 290 K/UL — SIGNIFICANT CHANGE UP (ref 150–400)
POTASSIUM SERPL-MCNC: 4.1 MMOL/L — SIGNIFICANT CHANGE UP (ref 3.5–5.3)
POTASSIUM SERPL-SCNC: 4.1 MMOL/L — SIGNIFICANT CHANGE UP (ref 3.5–5.3)
RBC # BLD: 3.66 M/UL — LOW (ref 3.8–5.2)
RBC # FLD: 13.1 % — SIGNIFICANT CHANGE UP (ref 10.3–14.5)
SODIUM SERPL-SCNC: 141 MMOL/L — SIGNIFICANT CHANGE UP (ref 135–145)
WBC # BLD: 4.53 K/UL — SIGNIFICANT CHANGE UP (ref 3.8–10.5)
WBC # FLD AUTO: 4.53 K/UL — SIGNIFICANT CHANGE UP (ref 3.8–10.5)

## 2022-04-27 PROCEDURE — 74177 CT ABD & PELVIS W/CONTRAST: CPT | Mod: 26

## 2022-04-27 PROCEDURE — 99232 SBSQ HOSP IP/OBS MODERATE 35: CPT

## 2022-04-27 PROCEDURE — 99231 SBSQ HOSP IP/OBS SF/LOW 25: CPT

## 2022-04-27 RX ADMIN — SODIUM CHLORIDE 110 MILLILITER(S): 9 INJECTION, SOLUTION INTRAVENOUS at 21:59

## 2022-04-27 RX ADMIN — HEPARIN SODIUM 5000 UNIT(S): 5000 INJECTION INTRAVENOUS; SUBCUTANEOUS at 21:57

## 2022-04-27 RX ADMIN — Medication 30 MILLIGRAM(S): at 22:15

## 2022-04-27 RX ADMIN — Medication 30 MILLIGRAM(S): at 21:56

## 2022-04-27 RX ADMIN — PANTOPRAZOLE SODIUM 40 MILLIGRAM(S): 20 TABLET, DELAYED RELEASE ORAL at 13:45

## 2022-04-27 RX ADMIN — SODIUM CHLORIDE 110 MILLILITER(S): 9 INJECTION, SOLUTION INTRAVENOUS at 13:45

## 2022-04-27 RX ADMIN — HEPARIN SODIUM 5000 UNIT(S): 5000 INJECTION INTRAVENOUS; SUBCUTANEOUS at 05:41

## 2022-04-27 RX ADMIN — HEPARIN SODIUM 5000 UNIT(S): 5000 INJECTION INTRAVENOUS; SUBCUTANEOUS at 13:45

## 2022-04-27 NOTE — PROGRESS NOTE ADULT - SUBJECTIVE AND OBJECTIVE BOX
Patient seen and examined at bedside  States pain resolved. +BM.   Denies nausea and vomiting.     Vital Signs Last 24 Hrs  T(F): 98.1 (04-27-22 @ 05:39), Max: 98.1 (04-27-22 @ 05:39)  HR: 64 (04-27-22 @ 05:39)  BP: 117/68 (04-27-22 @ 05:39)  RR: 16 (04-27-22 @ 05:39)  SpO2: 99% (04-27-22 @ 05:39)    GENERAL: Alert, NAD  CHEST/LUNG: respirations nonlabored  ABDOMEN: soft, Nontender, Nondistended  EXTREMITIES:  no calf tenderness, No edema    I&O's Detail    26 Apr 2022 07:01  -  27 Apr 2022 07:00  --------------------------------------------------------  IN:    dextrose 5% + sodium chloride 0.9%: 990 mL  Total IN: 990 mL    OUT:  Total OUT: 0 mL    Total NET: 990 mL    LABS:                        11.7   4.53  )-----------( 290      ( 27 Apr 2022 06:29 )             36.9     04-27    141  |  107  |  10  ----------------------------<  97  4.1   |  29  |  0.47<L>    Ca    9.3      27 Apr 2022 06:29    TPro  7.8  /  Alb  4.3  /  TBili  1.3<H>  /  DBili  x   /  AST  53<H>  /  ALT  38  /  AlkPhos  102  04-25

## 2022-04-27 NOTE — PROGRESS NOTE ADULT - SUBJECTIVE AND OBJECTIVE BOX
GI PROGRESS NOTE    Patient is a 46y old  Female who presents with a chief complaint of abdominal pain x 1 week (2022 12:05)      HPI:  46F with no sig PMH, PSH Shahid-en-Y gastric bypass 2014 (120lb weight loss after surgery), ex-lap, drainage of intraabdominal abscess, removal of mesh ring around gastric pouch 2014, incisional hernia repair 2015, abd wall mass excision 2016 presents c/o abdominal pain x 1 week.  Pt also c/o left sided chest pain for 2-3 days that worsens with touch and affects left shoulder when using shoulder bag. Pt states abdominal pain is in lower abdomen and worsened with eating.  Pt states she lost 9lb from inability to eat solid foods.  Pt admits to flatus and BM. She denies nausea, vomiting, fever or chills.  (2022 02:15)      GI HPI:   Pt denies abdominal pain, nausea, vomiting, diarrhea, constipation, hematochezia or melena.         ______________________________________________________________________  PMH/PSH:  PAST MEDICAL & SURGICAL HISTORY:  Narcolepsy    GERD (gastroesophageal reflux disease)    Obesity    JEFFREY (obstructive sleep apnea)  resolved after gastric bypass    Depression    Anxiety    Scoliosis of lumbar spine    Renal stone    S/P  section  x 2    S/P cholecystectomy    S/P vein stripping  right leg    H/O gastric bypass  2014 gastrostomy, lysis of adhesions    Perforated viscus  exploratory drainage of intraabdominal abcess and removal of mesh ring from around gastric pouch on 8/3/2014      ______________________________________________________________________  MEDS:  MEDICATIONS  (STANDING):  dextrose 5% + sodium chloride 0.9%. 1000 milliLiter(s) (110 mL/Hr) IV Continuous <Continuous>  heparin   Injectable 5000 Unit(s) SubCutaneous every 8 hours  pantoprazole  Injectable 40 milliGRAM(s) IV Push daily    MEDICATIONS  (PRN):  ketorolac   Injectable 30 milliGRAM(s) IV Push every 6 hours PRN Moderate Pain (4 - 6)  ondansetron Injectable 4 milliGRAM(s) IV Push every 6 hours PRN Nausea    ______________________________________________________________________  ALL:   Allergies    No Known Allergies    Intolerances      ______________________________________________________________________  FH:  FAMILY HISTORY:  Family history of hypertension (Grandparent)      ______________________________________________________________________  ROS:    CONSTITUTIONAL:  No weight loss, fever, chills, weakness or fatigue.    SKIN:  No rash or itching.    CARDIOVASCULAR:  No chest pain, chest pressure or chest discomfort. No palpitations or edema.    RESPIRATORY:  No shortness of breath, cough or sputum.    GASTROINTESTINAL:  SEE HPI    GENITOURINARY:  No dysuria, hematuria, urinary frequency    NEUROLOGICAL:  No headache, dizziness, syncope    MUSCULOSKELETAL:  No muscle, back pain, joint pain or stiffness.    ______________________________________________________________________  PHYSICAL EXAM:  T(C): 36.7 (22 @ 05:39), Max: 36.7 (22 @ 20:55)  HR: 64 (22 @ 05:39)  BP: 117/68 (22 @ 05:39)  RR: 16 (22 @ 05:39)  SpO2: 99% (22 @ 05:39)  Wt(kg): --      --------------------------------------------------------  IN:    dextrose 5% + sodium chloride 0.9%: 990 mL  Total IN: 990 mL    OUT:  Total OUT: 0 mL    Total NET: 990 mL          GEN: NAD   CVS- S1 S2  ABD: soft, nontender, non distended, bowel sounds+  LUNGS: clear to auscultation  NEURO: noin focal neuro exam; AAO x 3  Extremities: no cyanosis, no calf tenderness, no edema, no clubbing      ______________________________________________________________________  LABS:                        11.7   4.53  )-----------( 290      ( 2022 06:29 )             36.9         141  |  107  |  10  ----------------------------<  97  4.1   |  29  |  0.47<L>    Ca    9.3      2022 06:29    TPro  7.8  /  Alb  4.3  /  TBili  1.3<H>  /  DBili  x   /  AST  53<H>  /  ALT  38  /  AlkPhos  102      LIVER FUNCTIONS - ( 2022 19:34 )  Alb: 4.3 g/dL / Pro: 7.8 g/dL / ALK PHOS: 102 U/L / ALT: 38 U/L DA / AST: 53 U/L / GGT: x                  GI PROGRESS NOTE    Patient is a 46y old  Female who presents with a chief complaint of abdominal pain x 1 week (2022 12:05)      HPI:  46F with no sig PMH, PSH Shahid-en-Y gastric bypass 2014 (120lb weight loss after surgery), ex-lap, drainage of intraabdominal abscess, removal of mesh ring around gastric pouch 2014, incisional hernia repair 2015, abd wall mass excision 2016 presents c/o abdominal pain x 1 week.  Pt also c/o left sided chest pain for 2-3 days that worsens with touch and affects left shoulder when using shoulder bag. Pt states abdominal pain is in lower abdomen and worsened with eating.  Pt states she lost 9lb from inability to eat solid foods.  Pt admits to flatus and BM. She denies nausea, vomiting, fever or chills.  (2022 02:15)      GI HPI:   Pt denies abdominal pain, nausea, vomiting, diarrhea, constipation, hematochezia or melena.         ______________________________________________________________________  PMH/PSH:  PAST MEDICAL & SURGICAL HISTORY:  Narcolepsy    GERD (gastroesophageal reflux disease)    Obesity    JEFFREY (obstructive sleep apnea)  resolved after gastric bypass    Depression    Anxiety    Scoliosis of lumbar spine    Renal stone    S/P  section  x 2    S/P cholecystectomy    S/P vein stripping  right leg    H/O gastric bypass  2014 gastrostomy, lysis of adhesions    Perforated viscus  exploratory drainage of intraabdominal abcess and removal of mesh ring from around gastric pouch on 8/3/2014      ______________________________________________________________________  MEDS:  MEDICATIONS  (STANDING):  dextrose 5% + sodium chloride 0.9%. 1000 milliLiter(s) (110 mL/Hr) IV Continuous <Continuous>  heparin   Injectable 5000 Unit(s) SubCutaneous every 8 hours  pantoprazole  Injectable 40 milliGRAM(s) IV Push daily    MEDICATIONS  (PRN):  ketorolac   Injectable 30 milliGRAM(s) IV Push every 6 hours PRN Moderate Pain (4 - 6)  ondansetron Injectable 4 milliGRAM(s) IV Push every 6 hours PRN Nausea    ______________________________________________________________________  ALL:   Allergies    No Known Allergies    Intolerances      ______________________________________________________________________  FH:  FAMILY HISTORY:  Family history of hypertension (Grandparent)      ______________________________________________________________________  ROS:    CONSTITUTIONAL:  No weight loss, fever, chills, weakness or fatigue.    SKIN:  No rash or itching.    CARDIOVASCULAR:  No chest pain, chest pressure or chest discomfort. No palpitations or edema.    RESPIRATORY:  No shortness of breath, cough or sputum.    GASTROINTESTINAL:  SEE HPI    GENITOURINARY:  No dysuria, hematuria, urinary frequency    NEUROLOGICAL:  No headache, dizziness, syncope    MUSCULOSKELETAL:  No muscle, back pain, joint pain or stiffness.    ______________________________________________________________________  PHYSICAL EXAM:  T(C): 36.7 (22 @ 05:39), Max: 36.7 (22 @ 20:55)  HR: 64 (22 @ 05:39)  BP: 117/68 (22 @ 05:39)  RR: 16 (22 @ 05:39)  SpO2: 99% (22 @ 05:39)  Wt(kg): --      --------------------------------------------------------  IN:    dextrose 5% + sodium chloride 0.9%: 990 mL  Total IN: 990 mL    OUT:  Total OUT: 0 mL    Total NET: 990 mL          GEN: NAD   CVS- S1 S2  ABD: soft, nontender, non distended, bowel sounds+  LUNGS: clear to auscultation  NEURO: noin focal neuro exam; AAO x 3  Extremities: no cyanosis, no calf tenderness, no edema, no clubbing      ______________________________________________________________________  LABS:                        11.7   4.53  )-----------( 290      ( 2022 06:29 )             36.9         141  |  107  |  10  ----------------------------<  97  4.1   |  29  |  0.47<L>    Ca    9.3      2022 06:29    TPro  7.8  /  Alb  4.3  /  TBili  1.3<H>  /  DBili  x   /  AST  53<H>  /  ALT  38  /  AlkPhos  102      LIVER FUNCTIONS - ( 2022 19:34 )  Alb: 4.3 g/dL / Pro: 7.8 g/dL / ALK PHOS: 102 U/L / ALT: 38 U/L DA / AST: 53 U/L / GGT: x         ______________________________________________________________________  IMAGING:  < from: CT Enterography w/ Oral Cont and w/ IV Cont (22 @ 17:05) >    ACC: [id number] EXAM:  CT ENTEROGRAPHY OC IC                          PROCEDURE DATE:  2022          INTERPRETATION:  CLINICAL INFORMATION: Abdominal pain. Shahid-en-Y gastric   bypass. Distended JJ anastomosis containing fecalized material. Follow-up.    COMPARISON: CT abdomen and pelvis 2022 and 10/2/2018.    CONTRAST/COMPLICATIONS:  IV Contrast: Omnipaque 350  90 cc administered   10 cc discarded  Oral Contrast: VoLumen  Complications: None reported at time of study completion    PROCEDURE:  CT of the Abdomen and Pelvis (CT Enterography) was performed with   intravenous contrast in the late arterial phase.  Sagittal and coronal reformats were performed.    FINDINGS:  LOWER CHEST: Within normal limits.    LIVER: Within normal limits.  BILE DUCTS: Normal caliber.  GALLBLADDER: Cholecystectomy.  SPLEEN: Within normal limits.  PANCREAS: Within normal limits.  ADRENALS: Within normal limits.  KIDNEYS/URETERS: Subcentimeter hypodense foci in the bilateral kidneys   that are too small to characterize. No hydronephrosis.    BLADDER: Within normal limits.  REPRODUCTIVE ORGANS: Uterus and adnexa within normal limits.    BOWEL: Shahid-en-Y gastric bypass. Again noted is mild distention of the   jejunojejunal anastomosis. Small bowel isotherwise underdistended,   limiting assessment. Redemonstration of heterogeneous material within the   JJ anastomosis admixed with air with a fecalized appearance (series 6   image 39, series 4 image 70). The material is somewhat well-circumscribed   and measures 4.6 x 3.3 x 3.3 cm. No bowel dilation to suggest obstruction.  PERITONEUM: No ascites.  VESSELS: Within normal limits. Accessory left hepatic artery arising from   the left gastric artery. Replaced or accessory right hepatic artery   arising from the superior mesenteric artery.  RETROPERITONEUM/LYMPH NODES: No lymphadenopathy.  ABDOMINAL WALL: Postsurgical in the anterior abdominal wall. Tiny   fat-containing umbilical hernia. Scattered foci of air in the ventral   subcutaneous softtissue may be related to recent injection.  BONES: A 7 mm sclerotic focus and an adjacent 10 mm lucent focus within   the left iliac bone are unchanged since 2018, nonspecific. Mild   degenerative changes of the spine.    IMPRESSION:  Unchanged appearance of the jejunojejunal anastomosis, which remains   mildly distended with heterogeneous/fecalized material. Differential   continues to include fecalization secondary to slow transit or bezoar,   with intussusception thought to be less likely. No evidence of bowel   obstruction.

## 2022-04-27 NOTE — PROGRESS NOTE ADULT - PROBLEM SELECTOR PLAN 1
- likely due to debris seen in JJ anastomosis vs enteritis , unlikely bowel obstruction   - Pt symptoms improving  - Clear liquid diet  - Pain control  - Anti-emetics PRN  - c/w PPI  - CT enterography with close examination at JJ anastomosis and compare with prior CT    - obtain Stool for O&P, GI PCR, Iron panel , B12 and folate levels - likely due to debris seen in JJ anastomosis vs enteritis , unlikely bowel obstruction   - Pt symptoms improving  - Trial of full liquids   - Pain control  - Anti-emetics PRN  - c/w PPI  - CT enterography: Unchanged appearance of the jejunojejunal anastomosis, which remains mildly distended with heterogeneous/fecalized material. Differential continues to include fecalization secondary to slow transit or bezoar,   with intussusception thought to be less likely. No evidence of bowel obstruction  - Discussed the plan with patient (outpatient follow up vs extended small enteroscopy). Will reassess patient after the trial of full liquids.   - obtain Stool for O&P, GI PCR, Iron panel , B12 and folate levels

## 2022-04-28 LAB
ANION GAP SERPL CALC-SCNC: 5 MMOL/L — SIGNIFICANT CHANGE UP (ref 5–17)
BUN SERPL-MCNC: 5 MG/DL — LOW (ref 7–18)
CALCIUM SERPL-MCNC: 9 MG/DL — SIGNIFICANT CHANGE UP (ref 8.4–10.5)
CHLORIDE SERPL-SCNC: 107 MMOL/L — SIGNIFICANT CHANGE UP (ref 96–108)
CO2 SERPL-SCNC: 29 MMOL/L — SIGNIFICANT CHANGE UP (ref 22–31)
CREAT SERPL-MCNC: 0.42 MG/DL — LOW (ref 0.5–1.3)
EGFR: 123 ML/MIN/1.73M2 — SIGNIFICANT CHANGE UP
GLUCOSE SERPL-MCNC: 106 MG/DL — HIGH (ref 70–99)
HCT VFR BLD CALC: 35.9 % — SIGNIFICANT CHANGE UP (ref 34.5–45)
HGB BLD-MCNC: 11.7 G/DL — SIGNIFICANT CHANGE UP (ref 11.5–15.5)
MCHC RBC-ENTMCNC: 32.1 PG — SIGNIFICANT CHANGE UP (ref 27–34)
MCHC RBC-ENTMCNC: 32.6 GM/DL — SIGNIFICANT CHANGE UP (ref 32–36)
MCV RBC AUTO: 98.4 FL — SIGNIFICANT CHANGE UP (ref 80–100)
NRBC # BLD: 0 /100 WBCS — SIGNIFICANT CHANGE UP (ref 0–0)
PLATELET # BLD AUTO: 276 K/UL — SIGNIFICANT CHANGE UP (ref 150–400)
POTASSIUM SERPL-MCNC: 4.1 MMOL/L — SIGNIFICANT CHANGE UP (ref 3.5–5.3)
POTASSIUM SERPL-SCNC: 4.1 MMOL/L — SIGNIFICANT CHANGE UP (ref 3.5–5.3)
RBC # BLD: 3.65 M/UL — LOW (ref 3.8–5.2)
RBC # FLD: 12.9 % — SIGNIFICANT CHANGE UP (ref 10.3–14.5)
SODIUM SERPL-SCNC: 141 MMOL/L — SIGNIFICANT CHANGE UP (ref 135–145)
WBC # BLD: 5 K/UL — SIGNIFICANT CHANGE UP (ref 3.8–10.5)
WBC # FLD AUTO: 5 K/UL — SIGNIFICANT CHANGE UP (ref 3.8–10.5)

## 2022-04-28 PROCEDURE — 99232 SBSQ HOSP IP/OBS MODERATE 35: CPT

## 2022-04-28 RX ORDER — KETOROLAC TROMETHAMINE 30 MG/ML
30 SYRINGE (ML) INJECTION EVERY 6 HOURS
Refills: 0 | Status: DISCONTINUED | OUTPATIENT
Start: 2022-04-28 | End: 2022-04-29

## 2022-04-28 RX ORDER — ACETAMINOPHEN 500 MG
1000 TABLET ORAL EVERY 8 HOURS
Refills: 0 | Status: DISCONTINUED | OUTPATIENT
Start: 2022-04-28 | End: 2022-04-29

## 2022-04-28 RX ADMIN — Medication 30 MILLIGRAM(S): at 07:59

## 2022-04-28 RX ADMIN — Medication 30 MILLIGRAM(S): at 08:20

## 2022-04-28 RX ADMIN — Medication 30 MILLIGRAM(S): at 22:10

## 2022-04-28 RX ADMIN — HEPARIN SODIUM 5000 UNIT(S): 5000 INJECTION INTRAVENOUS; SUBCUTANEOUS at 21:38

## 2022-04-28 RX ADMIN — Medication 30 MILLIGRAM(S): at 21:38

## 2022-04-28 RX ADMIN — Medication 1000 MILLIGRAM(S): at 12:53

## 2022-04-28 RX ADMIN — SODIUM CHLORIDE 110 MILLILITER(S): 9 INJECTION, SOLUTION INTRAVENOUS at 05:11

## 2022-04-28 RX ADMIN — HEPARIN SODIUM 5000 UNIT(S): 5000 INJECTION INTRAVENOUS; SUBCUTANEOUS at 15:06

## 2022-04-28 RX ADMIN — Medication 1000 MILLIGRAM(S): at 13:45

## 2022-04-28 RX ADMIN — HEPARIN SODIUM 5000 UNIT(S): 5000 INJECTION INTRAVENOUS; SUBCUTANEOUS at 05:09

## 2022-04-28 RX ADMIN — SODIUM CHLORIDE 110 MILLILITER(S): 9 INJECTION, SOLUTION INTRAVENOUS at 12:53

## 2022-04-28 RX ADMIN — PANTOPRAZOLE SODIUM 40 MILLIGRAM(S): 20 TABLET, DELAYED RELEASE ORAL at 12:53

## 2022-04-28 NOTE — DIETITIAN INITIAL EVALUATION ADULT - ORAL INTAKE PTA/DIET HISTORY
manages her gastric bypass diet by avoiding carbonated beverages, sweets/ sweet drinks, small portions

## 2022-04-28 NOTE — DIETITIAN INITIAL EVALUATION ADULT - OTHER INFO
Pt seen, son present. Pt taking/ tolerating clears (taking mostly broth, little juice. Sx PA to add Ensure clears (discussed w/ pt who may try but if too sweet may avoid or may take some diluted). Rn reports pt for procedure tomorrow (EGD noted). Pt reports 11# wt loss w/ intake < 50% for 7-8 days PTA. Reports fairly stable wt at 169-170% PTA.

## 2022-04-28 NOTE — DIETITIAN INITIAL EVALUATION ADULT - REASON INDICATOR FOR ASSESSMENT
RN re: hx bariatric sx and unintentional wt loss. + Clears/ NPO day #3 (note: diet order changed to full liquids, w/ Enlive BID during writing this assessment)

## 2022-04-28 NOTE — DIETITIAN INITIAL EVALUATION ADULT - PERTINENT LABORATORY DATA
04-28    141  |  107  |  5<L>  ----------------------------<  106<H>  4.1   |  29  |  0.42<L>    Ca    9.0      28 Apr 2022 05:57

## 2022-04-28 NOTE — PROGRESS NOTE ADULT - PROBLEM SELECTOR PLAN 1
likely due to debris seen in JJ anastomosis vs enteritis , unlikely bowel obstruction    Pt symptoms improving  Trial of full liquid today  Pain control  Anti-emetics PRN  c/w PPI  CT enterography: Unchanged appearance of the jejunojejunal anastomosis, which remains mildly distended with heterogeneous/fecalized material. Differential continues to include fecalization secondary to slow transit or bezoar,   with intussusception thought to be less likely. No evidence of bowel obstruction  Pt requests inpt  extended small enteroscopy). NPO after midnight. EGD tomorrow  obtain Stool for O&P, GI PCR, Iron panel , B12 and folate levels

## 2022-04-28 NOTE — PROGRESS NOTE ADULT - SUBJECTIVE AND OBJECTIVE BOX
GI PROGRESS NOTE    Patient is a 46y old  Female who presents with a chief complaint of abdominal pain x 1 week (2022 12:05)      HPI:  46F with no sig PMH, PSH Shahid-en-Y gastric bypass 2014 (120lb weight loss after surgery), ex-lap, drainage of intraabdominal abscess, removal of mesh ring around gastric pouch 2014, incisional hernia repair 2015, abd wall mass excision 2016 presents c/o abdominal pain x 1 week.  Pt also c/o left sided chest pain for 2-3 days that worsens with touch and affects left shoulder when using shoulder bag. Pt states abdominal pain is in lower abdomen and worsened with eating.  Pt states she lost 9lb from inability to eat solid foods.  Pt admits to flatus and BM. She denies nausea, vomiting, fever or chills.  (2022 02:15)      GI: Pt awake/alert, endorses feeling better , tolerating clear liquid. Pt requesting enteroscopy while inpt.     ______________________________________________________________________  PMH/PSH:  PAST MEDICAL & SURGICAL HISTORY:  Narcolepsy    GERD (gastroesophageal reflux disease)    Obesity    JEFFREY (obstructive sleep apnea)  resolved after gastric bypass    Depression    Anxiety    Scoliosis of lumbar spine    Renal stone    S/P  section  x 2    S/P cholecystectomy    S/P vein stripping  right leg    H/O gastric bypass  2014 gastrostomy, lysis of adhesions    Perforated viscus  exploratory drainage of intraabdominal abcess and removal of mesh ring from around gastric pouch on 8/3/2014      ______________________________________________________________________  MEDS:  MEDICATIONS  (STANDING):  dextrose 5% + sodium chloride 0.9%. 1000 milliLiter(s) (110 mL/Hr) IV Continuous <Continuous>  heparin   Injectable 5000 Unit(s) SubCutaneous every 8 hours  pantoprazole  Injectable 40 milliGRAM(s) IV Push daily    MEDICATIONS  (PRN):  acetaminophen     Tablet .. 1000 milliGRAM(s) Oral every 8 hours PRN Mild Pain (1 - 3)  ondansetron Injectable 4 milliGRAM(s) IV Push every 6 hours PRN Nausea    ______________________________________________________________________  ALL:   Allergies    No Known Allergies    Intolerances      ______________________________________________________________________  SH: Social History:    ______________________________________________________________________  FH:  FAMILY HISTORY:  Family history of hypertension (Grandparent)      ______________________________________________________________________  ROS:    CONSTITUTIONAL:  No weight loss, fever, chills, weakness or fatigue.    HEENT:  Eyes:  No visual loss, blurred vision, double vision or yellow sclerae. Ears, Nose, Throat:  No hearing loss, sneezing, congestion, runny nose or sore throat.    SKIN:  No rash or itching.    CARDIOVASCULAR:  No chest pain, chest pressure or chest discomfort. No palpitations or edema.    RESPIRATORY:  No shortness of breath, cough or sputum.    GASTROINTESTINAL:  SEE HPI    GENITOURINARY:  No dysuria, hematuria, urinary frequency    NEUROLOGICAL:  No headache, dizziness, syncope, paralysis, ataxia, numbness or tingling in the extremities. No change in bowel or bladder control.    MUSCULOSKELETAL:  No muscle, back pain, joint pain or stiffness.    HEMATOLOGIC:  No anemia, bleeding or bruising.    LYMPHATICS:  No enlarged nodes. No history of splenectomy.    PSYCHIATRIC:  No history of depression or anxiety.    ENDOCRINOLOGIC:  No reports of sweating, cold or heat intolerance. No polyuria or polydipsia.    ALLERGIES:  No history of asthma, hives, eczema or rhinitis.  ______________________________________________________________________  PHYSICAL EXAM:  T(C): 36.3 (22 @ 05:32), Max: 36.7 (22 @ 14:31)  HR: 56 (22 @ 05:32)  BP: 130/72 (22 @ 05:32)  RR: 15 (22 @ 05:32)  SpO2: 98% (22 @ 05:32)  Wt(kg): --      GEN: NAD   CVS- S1 S2  ABD: soft nontender, non distended, bowel sounds+  LUNGS: clear to auscultation  NEURO: non focal neuro exam; AAO x 3  Extremities: no cyanosis, no calf tenderness, no edema, no clubbing      ______________________________________________________________________  LABS:                        11.7   5.00  )-----------( 276      ( 2022 05:57 )             35.9         141  |  107  |  5<L>  ----------------------------<  106<H>  4.1   |  29  |  0.42<L>    Ca    9.0      2022 05:57        ______________________________________________________________________  IMAGING:                 GI PROGRESS NOTE    Patient is a 46y old  Female who presents with a chief complaint of abdominal pain x 1 week (2022 12:05)      HPI:  46F with no sig PMH, PSH Shahid-en-Y gastric bypass 2014 (120lb weight loss after surgery), ex-lap, drainage of intraabdominal abscess, removal of mesh ring around gastric pouch 2014, incisional hernia repair 2015, abd wall mass excision 2016 presents c/o abdominal pain x 1 week.  Pt also c/o left sided chest pain for 2-3 days that worsens with touch and affects left shoulder when using shoulder bag. Pt states abdominal pain is in lower abdomen and worsened with eating.  Pt states she lost 9lb from inability to eat solid foods.  Pt admits to flatus and BM. She denies nausea, vomiting, fever or chills.  (2022 02:15)      GI: Pt awake/alert, endorses feeling better , tolerating clear liquid. Pt requesting enteroscopy while inpt.     ______________________________________________________________________  PMH/PSH:  PAST MEDICAL & SURGICAL HISTORY:  Narcolepsy    GERD (gastroesophageal reflux disease)    Obesity    JEFFREY (obstructive sleep apnea)  resolved after gastric bypass    Depression    Anxiety    Scoliosis of lumbar spine    Renal stone    S/P  section  x 2    S/P cholecystectomy    S/P vein stripping  right leg    H/O gastric bypass  2014 gastrostomy, lysis of adhesions    Perforated viscus  exploratory drainage of intraabdominal abcess and removal of mesh ring from around gastric pouch on 8/3/2014      ______________________________________________________________________  MEDS:  MEDICATIONS  (STANDING):  dextrose 5% + sodium chloride 0.9%. 1000 milliLiter(s) (110 mL/Hr) IV Continuous <Continuous>  heparin   Injectable 5000 Unit(s) SubCutaneous every 8 hours  pantoprazole  Injectable 40 milliGRAM(s) IV Push daily    MEDICATIONS  (PRN):  acetaminophen     Tablet .. 1000 milliGRAM(s) Oral every 8 hours PRN Mild Pain (1 - 3)  ondansetron Injectable 4 milliGRAM(s) IV Push every 6 hours PRN Nausea    ______________________________________________________________________  ALL:   Allergies    No Known Allergies    Intolerances      ______________________________________________________________________  SH: Social History:    ______________________________________________________________________  FH:  FAMILY HISTORY:  Family history of hypertension (Grandparent)      ______________________________________________________________________  ROS:    CONSTITUTIONAL:  No weight loss, fever, chills, weakness or fatigue.    HEENT:  Eyes:  No visual loss, blurred vision, double vision or yellow sclerae. Ears, Nose, Throat:  No hearing loss, sneezing, congestion, runny nose or sore throat.    SKIN:  No rash or itching.    CARDIOVASCULAR:  No chest pain, chest pressure or chest discomfort. No palpitations or edema.    RESPIRATORY:  No shortness of breath, cough or sputum.    GASTROINTESTINAL:  SEE HPI    GENITOURINARY:  No dysuria, hematuria, urinary frequency    NEUROLOGICAL:  No headache, dizziness, syncope, paralysis, ataxia, numbness or tingling in the extremities. No change in bowel or bladder control.    MUSCULOSKELETAL:  No muscle, back pain, joint pain or stiffness.    HEMATOLOGIC:  No anemia, bleeding or bruising.    LYMPHATICS:  No enlarged nodes. No history of splenectomy.    PSYCHIATRIC:  No history of depression or anxiety.    ENDOCRINOLOGIC:  No reports of sweating, cold or heat intolerance. No polyuria or polydipsia.    ALLERGIES:  No history of asthma, hives, eczema or rhinitis.  ______________________________________________________________________  PHYSICAL EXAM:  T(C): 36.3 (22 @ 05:32), Max: 36.7 (22 @ 14:31)  HR: 56 (22 @ 05:32)  BP: 130/72 (22 @ 05:32)  RR: 15 (22 @ 05:32)  SpO2: 98% (22 @ 05:32)  Wt(kg): --      GEN: NAD   CVS- S1 S2  ABD: soft nontender, non distended, bowel sounds+  LUNGS: clear to auscultation  NEURO: non focal neuro exam; AAO x 3  Extremities: no cyanosis, no calf tenderness, no edema, no clubbing      ______________________________________________________________________  LABS:                        11.7   5.00  )-----------( 276      ( 2022 05:57 )             35.9         141  |  107  |  5<L>  ----------------------------<  106<H>  4.1   |  29  |  0.42<L>    Ca    9.0      2022 05:57        ______________________________________________________________________  IMAGING:    `< from: CT Enterography w/ Oral Cont and w/ IV Cont (04.27.22 @ 17:05) >    COMPARISON: CT abdomen and pelvis 2022 and 10/2/2018.    CONTRAST/COMPLICATIONS:  IV Contrast: Omnipaque 350  90 cc administered   10 cc discarded  Oral Contrast: VoLumen  Complications: None reported at time of study completion    PROCEDURE:  CT of the Abdomen and Pelvis (CT Enterography) was performed with   intravenous contrast in the late arterial phase.  Sagittal and coronal reformats were performed.    FINDINGS:  LOWER CHEST: Within normal limits.    LIVER: Within normal limits.  BILE DUCTS: Normal caliber.  GALLBLADDER: Cholecystectomy.  SPLEEN: Within normal limits.  PANCREAS: Within normal limits.  ADRENALS: Within normal limits.  KIDNEYS/URETERS: Subcentimeter hypodense foci in the bilateral kidneys   that are too small to characterize. No hydronephrosis.    BLADDER: Within normal limits.  REPRODUCTIVE ORGANS: Uterus and adnexa within normal limits.    BOWEL: Shahid-en-Y gastric bypass. Again noted is mild distention of the   jejunojejunal anastomosis. Small bowel isotherwise underdistended,   limiting assessment. Redemonstration of heterogeneous material within the   JJ anastomosis admixed with air with a fecalized appearance (series 6   image 39, series 4 image 70). The material is somewhat well-circumscribed   and measures 4.6 x 3.3 x 3.3 cm. No bowel dilation to suggest obstruction.  PERITONEUM: No ascites.  VESSELS: Within normal limits. Accessory left hepatic artery arising from   the left gastric artery. Replaced or accessory right hepatic artery   arising from the superior mesenteric artery.  RETROPERITONEUM/LYMPH NODES: No lymphadenopathy.  ABDOMINAL WALL: Postsurgical in the anterior abdominal wall. Tiny   fat-containing umbilical hernia. Scattered foci of air in the ventral   subcutaneous softtissue may be related to recent injection.  BONES: A 7 mm sclerotic focus and an adjacent 10 mm lucent focus within   the left iliac bone are unchanged since 2018, nonspecific. Mild   degenerative changes of the spine.    IMPRESSION:  Unchanged appearance of the jejunojejunal anastomosis, which remains   mildly distended with heterogeneous/fecalized material. Differential   continues to include fecalization secondary to slow transit or bezoar,   with intussusception thought to be less likely. No evidence of bowel   obstruction.    < end of copied text >  < from: CT Abdomen and Pelvis w/ IV Cont (22 @ 23:30) >    INTERPRETATION:  CLINICAL INFORMATION: Abdominal pain.    COMPARISON: 10/2/2018    CONTRAST/COMPLICATIONS:  IV Contrast: Omnipaque 350  90 cc administered   10 cc discarded  Oral Contrast: NONE  Complications: None reported at time of study completion    PROCEDURE:  CT of the Abdomen and Pelvis was performed.  Sagittal and coronal reformats were performed.    FINDINGS:  LOWER CHEST: Mild bibasilar linear atelectasis.    LIVER: Within normal limits.  BILE DUCTS: Normal caliber.  GALLBLADDER: Cholecystectomy.  SPLEEN: Within normal limits.  PANCREAS: Within normal limits.  ADRENALS: Within normal limits.  KIDNEYS/URETERS: No hydronephrosis. Homogeneous renal enhancement.   Bilateral renal hypodensities too small to characterize.    BLADDER: Within normal limits.  REPRODUCTIVE ORGANS: Uterus and adnexa within normal limits.    BOWEL: No bowel obstruction. Appendix is not visualized. No evidence of   inflammation in the pericecal region. Status post Shahid-en-Y gastric   bypass. Mildly distended JJ anastomosis containing fecalized material,   measuring up to 4.3 x 3.4 cm (2, 63).  PERITONEUM: Trace free fluid adjacent to the right ovary.  VESSELS: Within normal limits.  RETROPERITONEUM/LYMPH NODES: No lymphadenopathy.  ABDOMINAL WALL: Tiny fat-containing umbilical hernia. Postsurgical   changes.  BONES: Mild degenerative changes. Stable 7 mm sclerotic lesion and 9 mm   lucent lesion in the left iliac bone, nonspecific.    IMPRESSION:  Mildly distended JJ anastomosis containing fecalized material. Findings   could reflect fecalization secondary to slow transit vs. possibly bezoar.   Intussusception is considered less likely. No evidence of obstruction at   this time. Follow-up with oral contrast can be obtained for further   evaluation.    < end of copied text >  < from: Xray Chest 2 Views PA/Lat (22 @ 21:17) >    IMPRESSION:    No radiographic evidence of active chest disease..    < end of copied text >

## 2022-04-28 NOTE — DIETITIAN INITIAL EVALUATION ADULT - DIET TYPE
w/ Ensure MAX BID/supplement (specify)/consistent carbohydrate full liquid w/ Ensure MAX BID or bariatric full liquids w/ Ensure Max BID/supplement (specify)/consistent carbohydrate full liquid

## 2022-04-28 NOTE — PROGRESS NOTE ADULT - SUBJECTIVE AND OBJECTIVE BOX
INTERVAL HPI/OVERNIGHT EVENTS:  No acute events overnight. Pt resting comfortably. No acute complaints. Tolerating clear diet. Passing flatus. Pain has improved.     MEDICATIONS  (STANDING):  dextrose 5% + sodium chloride 0.9%. 1000 milliLiter(s) (110 mL/Hr) IV Continuous <Continuous>  heparin   Injectable 5000 Unit(s) SubCutaneous every 8 hours  pantoprazole  Injectable 40 milliGRAM(s) IV Push daily    MEDICATIONS  (PRN):  ketorolac   Injectable 30 milliGRAM(s) IV Push every 6 hours PRN Moderate Pain (4 - 6)  ondansetron Injectable 4 milliGRAM(s) IV Push every 6 hours PRN Nausea      Vital Signs Last 24 Hrs  T(C): 36.3 (28 Apr 2022 05:32), Max: 36.7 (27 Apr 2022 14:31)  T(F): 97.4 (28 Apr 2022 05:32), Max: 98 (27 Apr 2022 14:31)  HR: 56 (28 Apr 2022 05:32) (56 - 83)  BP: 130/72 (28 Apr 2022 05:32) (130/72 - 136/72)  BP(mean): --  RR: 15 (28 Apr 2022 05:32) (15 - 16)  SpO2: 98% (28 Apr 2022 05:32) (98% - 98%)    Physical:  General: Alert and oriented, not in acute distress  Resp: Breathing unlabored  Abdomen: Soft, nondistended, nontender  : No coker catheter, no dysuria or hematuria  Extremities: No pedal edema      LABS:                        11.7   5.00  )-----------( 276      ( 28 Apr 2022 05:57 )             35.9             04-28    141  |  107  |  5<L>  ----------------------------<  106<H>  4.1   |  29  |  0.42<L>    Ca    9.0      28 Apr 2022 05:57    < from: CT Enterography w/ Oral Cont and w/ IV Cont (04.27.22 @ 17:05) >  IMPRESSION:  Unchanged appearance of the jejunojejunal anastomosis, which remains   mildly distended with heterogeneous/fecalized material. Differential   continues to include fecalization secondary to slow transit or bezoar,   with intussusception thought to be less likely. No evidence of bowel   obstruction.  --- End of Report ---  RANDY JAUREGUI MD; Attending Radiologist  This document has been electronically signed. Apr 27 2022  6:15PM  < end of copied text >

## 2022-04-28 NOTE — DIETITIAN INITIAL EVALUATION ADULT - NS FNS DIET ORDER
Diet, NPO after Midnight:      NPO Start Date: 28-Apr-2022,   NPO Start Time: 23:59 (04-28-22 @ 17:18)  Diet, Full Liquid:   Supplement Feeding Modality:  Oral  Ensure Enlive Cans or Servings Per Day:  1       Frequency:  Two Times a day (04-28-22 @ 17:18)

## 2022-04-28 NOTE — DIETITIAN INITIAL EVALUATION ADULT - PERTINENT MEDS FT
MEDICATIONS  (STANDING):  dextrose 5% + sodium chloride 0.9%. 1000 milliLiter(s) (110 mL/Hr) IV Continuous <Continuous>  heparin   Injectable 5000 Unit(s) SubCutaneous every 8 hours  pantoprazole  Injectable 40 milliGRAM(s) IV Push daily    MEDICATIONS  (PRN):  acetaminophen     Tablet .. 1000 milliGRAM(s) Oral every 8 hours PRN Mild Pain (1 - 3)  ondansetron Injectable 4 milliGRAM(s) IV Push every 6 hours PRN Nausea

## 2022-04-29 ENCOUNTER — TRANSCRIPTION ENCOUNTER (OUTPATIENT)
Age: 47
End: 2022-04-29

## 2022-04-29 VITALS
HEART RATE: 69 BPM | RESPIRATION RATE: 18 BRPM | OXYGEN SATURATION: 97 % | SYSTOLIC BLOOD PRESSURE: 110 MMHG | DIASTOLIC BLOOD PRESSURE: 61 MMHG | TEMPERATURE: 98 F

## 2022-04-29 LAB
ANION GAP SERPL CALC-SCNC: 5 MMOL/L — SIGNIFICANT CHANGE UP (ref 5–17)
BASOPHILS # BLD AUTO: 0.06 K/UL — SIGNIFICANT CHANGE UP (ref 0–0.2)
BASOPHILS NFR BLD AUTO: 1.3 % — SIGNIFICANT CHANGE UP (ref 0–2)
BUN SERPL-MCNC: 6 MG/DL — LOW (ref 7–18)
CALCIUM SERPL-MCNC: 9 MG/DL — SIGNIFICANT CHANGE UP (ref 8.4–10.5)
CHLORIDE SERPL-SCNC: 109 MMOL/L — HIGH (ref 96–108)
CO2 SERPL-SCNC: 28 MMOL/L — SIGNIFICANT CHANGE UP (ref 22–31)
CREAT SERPL-MCNC: 0.4 MG/DL — LOW (ref 0.5–1.3)
EGFR: 124 ML/MIN/1.73M2 — SIGNIFICANT CHANGE UP
EOSINOPHIL # BLD AUTO: 0.07 K/UL — SIGNIFICANT CHANGE UP (ref 0–0.5)
EOSINOPHIL NFR BLD AUTO: 1.5 % — SIGNIFICANT CHANGE UP (ref 0–6)
FERRITIN SERPL-MCNC: 37 NG/ML — SIGNIFICANT CHANGE UP (ref 15–150)
FOLATE SERPL-MCNC: >20 NG/ML — SIGNIFICANT CHANGE UP
GLUCOSE SERPL-MCNC: 103 MG/DL — HIGH (ref 70–99)
HCG UR QL: NEGATIVE — SIGNIFICANT CHANGE UP
HCT VFR BLD CALC: 34.4 % — LOW (ref 34.5–45)
HGB BLD-MCNC: 11.4 G/DL — LOW (ref 11.5–15.5)
IMM GRANULOCYTES NFR BLD AUTO: 0.2 % — SIGNIFICANT CHANGE UP (ref 0–1.5)
IRON SATN MFR SERPL: 16 % — SIGNIFICANT CHANGE UP (ref 15–50)
IRON SATN MFR SERPL: 49 UG/DL — SIGNIFICANT CHANGE UP (ref 40–150)
LYMPHOCYTES # BLD AUTO: 1.91 K/UL — SIGNIFICANT CHANGE UP (ref 1–3.3)
LYMPHOCYTES # BLD AUTO: 41.7 % — SIGNIFICANT CHANGE UP (ref 13–44)
MCHC RBC-ENTMCNC: 32.2 PG — SIGNIFICANT CHANGE UP (ref 27–34)
MCHC RBC-ENTMCNC: 33.1 GM/DL — SIGNIFICANT CHANGE UP (ref 32–36)
MCV RBC AUTO: 97.2 FL — SIGNIFICANT CHANGE UP (ref 80–100)
MONOCYTES # BLD AUTO: 0.42 K/UL — SIGNIFICANT CHANGE UP (ref 0–0.9)
MONOCYTES NFR BLD AUTO: 9.2 % — SIGNIFICANT CHANGE UP (ref 2–14)
NEUTROPHILS # BLD AUTO: 2.11 K/UL — SIGNIFICANT CHANGE UP (ref 1.8–7.4)
NEUTROPHILS NFR BLD AUTO: 46.1 % — SIGNIFICANT CHANGE UP (ref 43–77)
NRBC # BLD: 0 /100 WBCS — SIGNIFICANT CHANGE UP (ref 0–0)
PLATELET # BLD AUTO: 266 K/UL — SIGNIFICANT CHANGE UP (ref 150–400)
POTASSIUM SERPL-MCNC: 3.8 MMOL/L — SIGNIFICANT CHANGE UP (ref 3.5–5.3)
POTASSIUM SERPL-SCNC: 3.8 MMOL/L — SIGNIFICANT CHANGE UP (ref 3.5–5.3)
RBC # BLD: 3.54 M/UL — LOW (ref 3.8–5.2)
RBC # FLD: 12.8 % — SIGNIFICANT CHANGE UP (ref 10.3–14.5)
SODIUM SERPL-SCNC: 142 MMOL/L — SIGNIFICANT CHANGE UP (ref 135–145)
TIBC SERPL-MCNC: 315 UG/DL — SIGNIFICANT CHANGE UP (ref 250–450)
UIBC SERPL-MCNC: 266 UG/DL — SIGNIFICANT CHANGE UP (ref 110–370)
VIT B12 SERPL-MCNC: 658 PG/ML — SIGNIFICANT CHANGE UP (ref 232–1245)
WBC # BLD: 4.58 K/UL — SIGNIFICANT CHANGE UP (ref 3.8–10.5)
WBC # FLD AUTO: 4.58 K/UL — SIGNIFICANT CHANGE UP (ref 3.8–10.5)

## 2022-04-29 PROCEDURE — 71046 X-RAY EXAM CHEST 2 VIEWS: CPT

## 2022-04-29 PROCEDURE — 82746 ASSAY OF FOLIC ACID SERUM: CPT

## 2022-04-29 PROCEDURE — 44360 SMALL BOWEL ENDOSCOPY: CPT

## 2022-04-29 PROCEDURE — 87635 SARS-COV-2 COVID-19 AMP PRB: CPT

## 2022-04-29 PROCEDURE — 85025 COMPLETE CBC W/AUTO DIFF WBC: CPT

## 2022-04-29 PROCEDURE — 80053 COMPREHEN METABOLIC PANEL: CPT

## 2022-04-29 PROCEDURE — 83550 IRON BINDING TEST: CPT

## 2022-04-29 PROCEDURE — 99232 SBSQ HOSP IP/OBS MODERATE 35: CPT

## 2022-04-29 PROCEDURE — 82607 VITAMIN B-12: CPT

## 2022-04-29 PROCEDURE — 99285 EMERGENCY DEPT VISIT HI MDM: CPT

## 2022-04-29 PROCEDURE — 96375 TX/PRO/DX INJ NEW DRUG ADDON: CPT

## 2022-04-29 PROCEDURE — 80048 BASIC METABOLIC PNL TOTAL CA: CPT

## 2022-04-29 PROCEDURE — 82728 ASSAY OF FERRITIN: CPT

## 2022-04-29 PROCEDURE — 83540 ASSAY OF IRON: CPT

## 2022-04-29 PROCEDURE — 36415 COLL VENOUS BLD VENIPUNCTURE: CPT

## 2022-04-29 PROCEDURE — 86901 BLOOD TYPING SEROLOGIC RH(D): CPT

## 2022-04-29 PROCEDURE — 85027 COMPLETE CBC AUTOMATED: CPT

## 2022-04-29 PROCEDURE — 96374 THER/PROPH/DIAG INJ IV PUSH: CPT

## 2022-04-29 PROCEDURE — 93005 ELECTROCARDIOGRAM TRACING: CPT

## 2022-04-29 PROCEDURE — 74177 CT ABD & PELVIS W/CONTRAST: CPT

## 2022-04-29 PROCEDURE — 86850 RBC ANTIBODY SCREEN: CPT

## 2022-04-29 PROCEDURE — 81025 URINE PREGNANCY TEST: CPT

## 2022-04-29 PROCEDURE — 86900 BLOOD TYPING SEROLOGIC ABO: CPT

## 2022-04-29 PROCEDURE — 84484 ASSAY OF TROPONIN QUANT: CPT

## 2022-04-29 PROCEDURE — 84702 CHORIONIC GONADOTROPIN TEST: CPT

## 2022-04-29 RX ORDER — OMEPRAZOLE 10 MG/1
1 CAPSULE, DELAYED RELEASE ORAL
Qty: 14 | Refills: 0
Start: 2022-04-29 | End: 2022-05-12

## 2022-04-29 RX ORDER — ONDANSETRON 8 MG/1
1 TABLET, FILM COATED ORAL
Qty: 12 | Refills: 0
Start: 2022-04-29

## 2022-04-29 RX ORDER — ACETAMINOPHEN 500 MG
2 TABLET ORAL
Qty: 0 | Refills: 0 | DISCHARGE
Start: 2022-04-29

## 2022-04-29 RX ADMIN — HEPARIN SODIUM 5000 UNIT(S): 5000 INJECTION INTRAVENOUS; SUBCUTANEOUS at 06:03

## 2022-04-29 RX ADMIN — ONDANSETRON 4 MILLIGRAM(S): 8 TABLET, FILM COATED ORAL at 11:44

## 2022-04-29 NOTE — DISCHARGE NOTE NURSING/CASE MANAGEMENT/SOCIAL WORK - PATIENT PORTAL LINK FT
You can access the FollowMyHealth Patient Portal offered by [facility] by registering at the following website: [email or web address]. By joining [facility]’s FollowMyHealth portal, you will also be able to view your health information using other applications (apps) compatible with our system.

## 2022-04-29 NOTE — DISCHARGE NOTE PROVIDER - NSDCCPTREATMENT_GEN_ALL_CORE_FT
PRINCIPAL PROCEDURE  Procedure: EGD  Findings and Treatment: Liquid diet as tolerated, follow up with Dr. Cunningham within 2 weeks. Take omeprazole for 2 weeks, and extend as needed as instructed on follow up appointment. Zofran as needed for nausea

## 2022-04-29 NOTE — DISCHARGE NOTE PROVIDER - HOSPITAL COURSE
46F with no sig PMH, PSH Shahid-en-Y gastric bypass 7/2014 (120lb weight loss after surgery), ex-lap, drainage of intraabdominal abscess, removal of mesh ring around gastric pouch 8/2014, incisional hernia repair 11/2015, abd wall mass excision 4/2016 presents c/o abdominal pain x 1 week.  Pt also c/o left sided chest pain for 2-3 days that worsens with touch and affects left shoulder when using shoulder bag. Pt states abdominal pain is in lower abdomen and worsened with eating.  Pt states she lost 9lb from inability to eat solid foods.  Pt admits to flatus and BM. She denies nausea, vomiting, fever or chills.     Work up revealed a bezoar at JJ anastomosis on CT enterography. GI was consulted and performed an EGD but was unsuccessful to completely extract/bypass bezoar under sedation. Pt is recommended to be discharged on clears and follow up as outpatient, if not resolved on repeat CT scan, will consider another endoscopy under general anesthesia as necessary. Pt is being discharged with strict follow up instructions.

## 2022-04-29 NOTE — DISCHARGE NOTE PROVIDER - NSDCMRMEDTOKEN_GEN_ALL_CORE_FT
acetaminophen 500 mg oral tablet: 2 tab(s) orally every 8 hours, As needed, Mild Pain (1 - 3)  ascorbic acid 500 mg oral tablet: 1 tab(s) orally once a day  omeprazole 40 mg oral delayed release capsule: 1 cap(s) orally once a day   ondansetron 4 mg oral tablet, disintegratin tab(s) orally every 8 hours

## 2022-04-29 NOTE — DISCHARGE NOTE PROVIDER - CARE PROVIDER_API CALL
Isra Gonsalez)  Surgery  [address] [place] [id number]  Phone: [phone]  Fax: [phone]  Follow Up Time: 2 weeks    Carlos Cunningham)  Gastroenterology; Internal Medicine  [address] [facility] [address] [place] [address]  [place] [address]  Phone: [phone]  Fax: [phone]  Follow Up Time: 2 weeks

## 2022-04-29 NOTE — PROGRESS NOTE ADULT - ASSESSMENT
46F a/w abdominal pain, found to have mildly distended JJ anastomosis containing bezoar    - Plan for EGD today, f/u result  - dvt/gi ppx  
46F a/w abdominal pain, found to have mildly distended JJ anastomosis containing fecalized material on CT scan.   CT enterography results reviewed with patient  pt agreeable to remain in-house for EGD    - radha richmond advance  - NPO after midnight  - Plan for EGD 4/29  - discussed with Dr. Gonsalez      
46F with no sig PMH, PSH Shahid-en-Y gastric bypass 7/2014 (120lb weight loss after surgery), ex-lap, drainage of intraabdominal abscess, removal of mesh ring around gastric pouch 8/2014, incisional hernia repair 11/2015, abd wall mass excision 4/2016 presents c/o abdominal pain x 1 week. . CT abd/pelvis with IV contrasts shows mldly distended JJ anastomosis containing focalized material. GI consulted for further evaluation . 
47yo F with gastric bypass 2014 with abdominal pain secondary to jejunojejunal anastomotic debris vs bezoar    -GI consult, Dr. Cunningham   -NPO, IVF   -Serial abdominal exams  -Pain control  -Anti-emetics, PPI  -Discussed with Dr. Gonsalez 
46 year old female presented with abdominal pain which is now resolved. Found to have Mildly distended JJ anastomosis containing fecalized material on CT scan.     - GI recommending CT enterorrhaphy, will discuss with radiology   - NPO, IVF  - will discuss with Dr. Gonsalez 
46F with no sig PMH, PSH Shahid-en-Y gastric bypass 7/2014 (120lb weight loss after surgery), ex-lap, drainage of intraabdominal abscess, removal of mesh ring around gastric pouch 8/2014, incisional hernia repair 11/2015, abd wall mass excision 4/2016 presents c/o abdominal pain x 1 week. . CT abd/pelvis with IV contrasts shows mldly distended JJ anastomosis containing focalized material. GI consulted for further evaluation .

## 2022-04-29 NOTE — DISCHARGE NOTE NURSING/CASE MANAGEMENT/SOCIAL WORK - NSDCPEFALRISK_GEN_ALL_CORE
For information on Fall & Injury Prevention, visit: [email or web address] OR  [email or web address] OR  https://www.cdc.gov/steadi/patient.html

## 2022-04-29 NOTE — DISCHARGE NOTE PROVIDER - PROVIDER TOKENS
PROVIDER:[TOKEN:[[id number]],FOLLOWUP:[2 weeks]],PROVIDER:[TOKEN:[[id number]],FOLLOWUP:[2 weeks]]

## 2022-04-29 NOTE — DISCHARGE NOTE PROVIDER - NSDCCPCAREPLAN_GEN_ALL_CORE_FT
PRINCIPAL DISCHARGE DIAGNOSIS  Diagnosis: Intractable abdominal pain  Assessment and Plan of Treatment: Due to jejunojejunal anastomosis bezoar

## 2022-04-29 NOTE — PROGRESS NOTE ADULT - NS ATTEND AMEND GEN_ALL_CORE FT
Patient seen and examined by me and discussed with the surgical team.   I have reviewed and agree with the documented findings and plan of care, with any exceptions noted.    Patient underwent EGD with Dr. Null this morning, and report to follow.  He was not able to visualize the J-J anastomosis, but just proximal to it, and there was no evidence of intraluminal pathology including the debris suggested on imaging.   The patient tolerated the procedure well.     We will plan for discharge to home with liquid diet until follow up in one week.  We will repeat imaging as an outpatient with plan for possible repeat extended EGD under general anesthesia as an outpatient, in consultation with Dr. Null.     Patient and her , at bedside were informed of and are agreeable to this plan.      Patient's questions and concerns addressed to patient's satisfaction.
Patient seen and examined by me and discussed with the surgical team.   I have reviewed and agree with the documented findings and plan of care, with any exceptions noted.  Plan as above.     Patient's questions and concerns addressed to patient's satisfaction.
Patient seen and examined by me and discussed with the surgical team.   I have reviewed and agree with the documented findings and plan of care, with any exceptions noted.  Plan as above.     Patient's questions and concerns addressed to patient's satisfaction.

## 2022-04-29 NOTE — PROGRESS NOTE ADULT - SUBJECTIVE AND OBJECTIVE BOX
INTERVAL HPI/OVERNIGHT EVENTS:    No acute events overnight.   Pt resting comfortably. No acute complaints.   Denies N/V    MEDICATIONS  (STANDING):  dextrose 5% + sodium chloride 0.9%. 1000 milliLiter(s) (110 mL/Hr) IV Continuous <Continuous>  pantoprazole  Injectable 40 milliGRAM(s) IV Push daily    MEDICATIONS  (PRN):  acetaminophen     Tablet .. 1000 milliGRAM(s) Oral every 8 hours PRN Mild Pain (1 - 3)  ketorolac   Injectable 30 milliGRAM(s) IV Push every 6 hours PRN Moderate Pain (4 - 6)  ondansetron Injectable 4 milliGRAM(s) IV Push every 6 hours PRN Nausea      Vital Signs Last 24 Hrs  T(C): 36.6 (29 Apr 2022 05:15), Max: 36.8 (28 Apr 2022 20:09)  T(F): 97.8 (29 Apr 2022 05:15), Max: 98.2 (28 Apr 2022 20:09)  HR: 68 (29 Apr 2022 05:15) (68 - 75)  BP: 128/81 (29 Apr 2022 05:15) (118/83 - 128/81)  BP(mean): --  RR: 18 (29 Apr 2022 05:15) (16 - 18)  SpO2: 100% (29 Apr 2022 05:15) (96% - 100%)      PHYSICAL EXAM  General: Alert and oriented, not in acute distress  Resp: Breathing unlabored  Abdomen: Soft, nondistended, nontender  : No coker catheter, no dysuria or hematuria  Extremities: No pedal edema    I&O's Detail    28 Apr 2022 07:01  -  29 Apr 2022 07:00  --------------------------------------------------------  IN:    dextrose 5% + sodium chloride 0.9%: 1320 mL  Total IN: 1320 mL    OUT:  Total OUT: 0 mL    Total NET: 1320 mL          LABS:                        11.4   4.58  )-----------( 266      ( 29 Apr 2022 06:15 )             34.4             04-29    142  |  109<H>  |  6<L>  ----------------------------<  103<H>  3.8   |  28  |  0.40<L>    Ca    9.0      29 Apr 2022 06:15

## 2022-05-12 ENCOUNTER — APPOINTMENT (OUTPATIENT)
Dept: GASTROENTEROLOGY | Facility: CLINIC | Age: 47
End: 2022-05-12
Payer: COMMERCIAL

## 2022-05-12 VITALS
WEIGHT: 156 LBS | OXYGEN SATURATION: 98 % | HEART RATE: 84 BPM | BODY MASS INDEX: 25.96 KG/M2 | DIASTOLIC BLOOD PRESSURE: 82 MMHG | TEMPERATURE: 97.7 F | SYSTOLIC BLOOD PRESSURE: 119 MMHG

## 2022-05-12 DIAGNOSIS — Z01.818 ENCOUNTER FOR OTHER PREPROCEDURAL EXAMINATION: ICD-10-CM

## 2022-05-12 DIAGNOSIS — K59.00 CONSTIPATION, UNSPECIFIED: ICD-10-CM

## 2022-05-12 DIAGNOSIS — R10.9 UNSPECIFIED ABDOMINAL PAIN: ICD-10-CM

## 2022-05-12 DIAGNOSIS — R93.89 ABNORMAL FINDINGS ON DIAGNOSTIC IMAGING OF OTHER SPECIFIED BODY STRUCTURES: ICD-10-CM

## 2022-05-12 PROCEDURE — 99213 OFFICE O/P EST LOW 20 MIN: CPT

## 2022-05-12 RX ORDER — POLYETHYLENE GLYCOL 3350 17 G/17G
17 POWDER, FOR SOLUTION ORAL
Qty: 30 | Refills: 0 | Status: ACTIVE | COMMUNITY
Start: 2022-05-12 | End: 1900-01-01

## 2022-05-19 ENCOUNTER — OUTPATIENT (OUTPATIENT)
Dept: OUTPATIENT SERVICES | Facility: HOSPITAL | Age: 47
LOS: 1 days | End: 2022-05-19
Payer: COMMERCIAL

## 2022-05-19 ENCOUNTER — APPOINTMENT (OUTPATIENT)
Dept: CT IMAGING | Facility: HOSPITAL | Age: 47
End: 2022-05-19
Payer: COMMERCIAL

## 2022-05-19 DIAGNOSIS — Z98.84 BARIATRIC SURGERY STATUS: Chronic | ICD-10-CM

## 2022-05-19 DIAGNOSIS — Z90.49 ACQUIRED ABSENCE OF OTHER SPECIFIED PARTS OF DIGESTIVE TRACT: Chronic | ICD-10-CM

## 2022-05-19 DIAGNOSIS — R93.89 ABNORMAL FINDINGS ON DIAGNOSTIC IMAGING OF OTHER SPECIFIED BODY STRUCTURES: ICD-10-CM

## 2022-05-19 DIAGNOSIS — Z98.89 OTHER SPECIFIED POSTPROCEDURAL STATES: Chronic | ICD-10-CM

## 2022-05-19 DIAGNOSIS — R10.9 UNSPECIFIED ABDOMINAL PAIN: ICD-10-CM

## 2022-05-19 DIAGNOSIS — R69 ILLNESS, UNSPECIFIED: Chronic | ICD-10-CM

## 2022-05-19 LAB — HCG UR QL: NEGATIVE — SIGNIFICANT CHANGE UP

## 2022-05-19 PROCEDURE — 74177 CT ABD & PELVIS W/CONTRAST: CPT

## 2022-05-19 PROCEDURE — 74177 CT ABD & PELVIS W/CONTRAST: CPT | Mod: 26

## 2022-05-19 PROCEDURE — 81025 URINE PREGNANCY TEST: CPT

## 2022-05-25 ENCOUNTER — INPATIENT (INPATIENT)
Facility: HOSPITAL | Age: 47
LOS: 2 days | Discharge: HOME | DRG: 330 | End: 2022-05-28
Attending: SURGERY | Admitting: SURGERY
Payer: COMMERCIAL

## 2022-05-25 ENCOUNTER — NON-APPOINTMENT (OUTPATIENT)
Age: 47
End: 2022-05-25

## 2022-05-25 ENCOUNTER — APPOINTMENT (OUTPATIENT)
Dept: GASTROENTEROLOGY | Facility: HOSPITAL | Age: 47
End: 2022-05-25

## 2022-05-25 ENCOUNTER — TRANSCRIPTION ENCOUNTER (OUTPATIENT)
Age: 47
End: 2022-05-25

## 2022-05-25 VITALS
HEART RATE: 66 BPM | DIASTOLIC BLOOD PRESSURE: 80 MMHG | OXYGEN SATURATION: 96 % | SYSTOLIC BLOOD PRESSURE: 126 MMHG | RESPIRATION RATE: 18 BRPM | TEMPERATURE: 98 F

## 2022-05-25 DIAGNOSIS — Z98.84 BARIATRIC SURGERY STATUS: Chronic | ICD-10-CM

## 2022-05-25 DIAGNOSIS — Z90.49 ACQUIRED ABSENCE OF OTHER SPECIFIED PARTS OF DIGESTIVE TRACT: Chronic | ICD-10-CM

## 2022-05-25 DIAGNOSIS — Z98.89 OTHER SPECIFIED POSTPROCEDURAL STATES: Chronic | ICD-10-CM

## 2022-05-25 DIAGNOSIS — R69 ILLNESS, UNSPECIFIED: Chronic | ICD-10-CM

## 2022-05-25 DIAGNOSIS — R93.89 ABNORMAL FINDINGS ON DIAGNOSTIC IMAGING OF OTHER SPECIFIED BODY STRUCTURES: ICD-10-CM

## 2022-05-25 LAB
HCG UR QL: NEGATIVE — SIGNIFICANT CHANGE UP
SARS-COV-2 N GENE NPH QL NAA+PROBE: NOT DETECTED
SARS-COV-2 RNA SPEC QL NAA+PROBE: SIGNIFICANT CHANGE UP

## 2022-05-25 PROCEDURE — 71045 X-RAY EXAM CHEST 1 VIEW: CPT | Mod: 26

## 2022-05-25 PROCEDURE — 99223 1ST HOSP IP/OBS HIGH 75: CPT | Mod: 57

## 2022-05-25 DEVICE — CATH ESOPH DIL 8 ATM 6FR10-12M: Type: IMPLANTABLE DEVICE | Status: FUNCTIONAL

## 2022-05-25 DEVICE — CATH BALLOON DIL 6-8MM: Type: IMPLANTABLE DEVICE | Status: FUNCTIONAL

## 2022-05-25 DEVICE — CATH ESOPH DIL 9 ATM 6FR 8-10MM: Type: IMPLANTABLE DEVICE | Status: FUNCTIONAL

## 2022-05-25 RX ORDER — FENTANYL CITRATE 50 UG/ML
100 INJECTION INTRAVENOUS ONCE
Refills: 0 | Status: DISCONTINUED | OUTPATIENT
Start: 2022-05-25 | End: 2022-05-25

## 2022-05-25 RX ORDER — DIPHENHYDRAMINE HCL 50 MG
25 CAPSULE ORAL ONCE
Refills: 0 | Status: COMPLETED | OUTPATIENT
Start: 2022-05-25 | End: 2022-05-25

## 2022-05-25 RX ORDER — PANTOPRAZOLE SODIUM 20 MG/1
40 TABLET, DELAYED RELEASE ORAL DAILY
Refills: 0 | Status: DISCONTINUED | OUTPATIENT
Start: 2022-05-25 | End: 2022-05-26

## 2022-05-25 RX ORDER — METRONIDAZOLE 500 MG
500 TABLET ORAL ONCE
Refills: 0 | Status: COMPLETED | OUTPATIENT
Start: 2022-05-25 | End: 2022-05-25

## 2022-05-25 RX ORDER — HYDROMORPHONE HYDROCHLORIDE 2 MG/ML
0.5 INJECTION INTRAMUSCULAR; INTRAVENOUS; SUBCUTANEOUS
Refills: 0 | Status: DISCONTINUED | OUTPATIENT
Start: 2022-05-25 | End: 2022-05-26

## 2022-05-25 RX ORDER — SODIUM CHLORIDE 9 MG/ML
1000 INJECTION, SOLUTION INTRAVENOUS
Refills: 0 | Status: DISCONTINUED | OUTPATIENT
Start: 2022-05-25 | End: 2022-05-26

## 2022-05-25 RX ORDER — CHLORHEXIDINE GLUCONATE 213 G/1000ML
1 SOLUTION TOPICAL ONCE
Refills: 0 | Status: COMPLETED | OUTPATIENT
Start: 2022-05-25 | End: 2022-05-26

## 2022-05-25 RX ORDER — ACETAMINOPHEN 500 MG
1000 TABLET ORAL ONCE
Refills: 0 | Status: COMPLETED | OUTPATIENT
Start: 2022-05-25 | End: 2022-05-25

## 2022-05-25 RX ORDER — CIPROFLOXACIN LACTATE 400MG/40ML
400 VIAL (ML) INTRAVENOUS ONCE
Refills: 0 | Status: COMPLETED | OUTPATIENT
Start: 2022-05-25 | End: 2022-05-25

## 2022-05-25 RX ORDER — ONDANSETRON 8 MG/1
4 TABLET, FILM COATED ORAL EVERY 6 HOURS
Refills: 0 | Status: DISCONTINUED | OUTPATIENT
Start: 2022-05-25 | End: 2022-05-26

## 2022-05-25 RX ORDER — KETOROLAC TROMETHAMINE 30 MG/ML
30 SYRINGE (ML) INJECTION EVERY 6 HOURS
Refills: 0 | Status: DISCONTINUED | OUTPATIENT
Start: 2022-05-25 | End: 2022-05-26

## 2022-05-25 RX ADMIN — Medication 200 MILLIGRAM(S): at 15:25

## 2022-05-25 RX ADMIN — HYDROMORPHONE HYDROCHLORIDE 0.5 MILLIGRAM(S): 2 INJECTION INTRAMUSCULAR; INTRAVENOUS; SUBCUTANEOUS at 20:41

## 2022-05-25 RX ADMIN — Medication 25 MILLIGRAM(S): at 22:39

## 2022-05-25 RX ADMIN — HYDROMORPHONE HYDROCHLORIDE 0.5 MILLIGRAM(S): 2 INJECTION INTRAMUSCULAR; INTRAVENOUS; SUBCUTANEOUS at 20:26

## 2022-05-25 RX ADMIN — Medication 30 MILLIGRAM(S): at 19:12

## 2022-05-25 RX ADMIN — Medication 100 MILLIGRAM(S): at 16:00

## 2022-05-25 RX ADMIN — Medication 1000 MILLIGRAM(S): at 16:42

## 2022-05-25 RX ADMIN — FENTANYL CITRATE 100 MICROGRAM(S): 50 INJECTION INTRAVENOUS at 16:42

## 2022-05-25 RX ADMIN — Medication 30 MILLIGRAM(S): at 20:46

## 2022-05-25 RX ADMIN — Medication 400 MILLIGRAM(S): at 15:20

## 2022-05-25 RX ADMIN — FENTANYL CITRATE 100 MICROGRAM(S): 50 INJECTION INTRAVENOUS at 15:20

## 2022-05-25 NOTE — PATIENT PROFILE ADULT - FUNCTIONAL ASSESSMENT - DAILY ACTIVITY 6.
"Occupational Therapy   Initial Evaluation     Patient Name: Javid Moreira  Age:  76 y.o., Sex:  male  Medical Record #: [id number]  Today's Date: 1/22/2022     Precautions: Fall Risk,Spinal / Back Precautions ,Cervical Collar    Comments: orders state collar on at all times     Assessment  Patient is 76 y.o. male admitted for elective spinal sx pt is POD#1 from C3-C6 ACDF, doing well pt demonstrated ability to complete ADL's and mobility w/o assist. Pt has some distal residual weakness and numbness of LUE but reports improvement post op. Pt has support from SO and demonstrated ability to manage ADL's, C-collar and apply spinal precautions. Anticipate no further acute OT needs.     Plan  Recommend Occupational Therapy for Evaluation only   DC Equipment Recommendations: None  Discharge Recommendations: Anticipate that the patient will have no further occupational therapy needs after discharge from the hospital     Subjective  \"I have no pain its great\"     Objective     01/22/22 0923   Prior Living Situation   Prior Services None   Housing / Facility 2 Story House   Steps Into Home 16   Steps In Home 0   Bathroom Set up Walk In Shower   Equipment Owned None   Lives with - Patient's Self Care Capacity Spouse   Comments pt reports SO is a retired RN and able to assist as needed    Prior Level of ADL Function   Self Feeding Independent   Grooming / Hygiene Independent   Bathing Independent   Dressing Independent   Toileting Independent   Prior Level of IADL Function   Medication Management Independent   Laundry Independent   Kitchen Mobility Independent   Finances Independent   Home Management Independent   Shopping Independent   Prior Level Of Mobility Independent Without Device in Community   Precautions   Precautions Fall Risk;Spinal / Back Precautions ;Cervical Collar     Comments orders state collar on at all times    Pain 0 - 10 Group   Therapist Pain Assessment 0;Nurse Notified   Cognition    Cognition / " Consciousness WDL   Level of Consciousness Alert   Passive ROM Upper Body   Passive ROM Upper Body WDL   Active ROM Upper Body   Active ROM Upper Body  X   Dominant Hand Right   Comments LUE limited by weakness    Strength Upper Body   Upper Body Strength  X   Comments LUE 3/5   Sensation Upper Body   Upper Extremity Sensation  Not Tested   Upper Body Muscle Tone   Upper Body Muscle Tone  WDL   Neurological Concerns   Neurological Concerns No   Coordination Upper Body   Coordination X   Comments LUE grossly impaired by weakness    Balance Assessment   Sitting Balance (Static) Good   Sitting Balance (Dynamic) Fair +   Standing Balance (Static) Good   Standing Balance (Dynamic) Fair +   Weight Shift Sitting Good   Weight Shift Standing Fair   Comments no AD    Bed Mobility    Comments up EOB and remained EOB    ADL Assessment   Grooming Supervision;Standing   Upper Body Dressing Supervision  (CGA for brace managment )   Lower Body Dressing Supervision   Toileting Supervision   How much help from another person does the patient currently need...   6 Clicks Daily Activity Score 24   Functional Mobility   Sit to Stand Supervised   Bed, Chair, Wheelchair Transfer Supervised   Toilet Transfers Supervised   Mobility walking in room no AD no LOB    Visual Perception   Visual Perception  Not Tested   Activity Tolerance   Comments no c/o pain or fatigue    Education Group   Education Provided Back Safety;Brace Wear and Care;Role of Occupational Therapist;Activities of Daily Living;Adaptive Equipment   Role of Occupational Therapist Patient Response Patient;Acceptance;Explanation   Back Safety Patient Response Patient;Acceptance;Explanation   Brace Wear & Care Patient Response Patient;Acceptance;Explanation;Handout   ADL Patient Response Patient;Acceptance;Explanation   Adaptive Equipment Patient Response Patient;Acceptance;Explanation   Problem List   Problem List None   Anticipated Discharge Equipment and Recommendations   DC  Equipment Recommendations None   Discharge Recommendations Anticipate that the patient will have no further occupational therapy needs after discharge from the hospital   Interdisciplinary Plan of Care Collaboration   IDT Collaboration with  Nursing   Patient Position at End of Therapy Seated;Edge of Bed;Call Light within Reach;Tray Table within Reach;Phone within Reach   Collaboration Comments RN aware of OT eval and pts efforts    Session Information   Date / Session Number  1/22 #1 eval only    Priority 0                  4 = No assist / stand by assistance

## 2022-05-25 NOTE — H&P ADULT - ASSESSMENT
46 y.o. F post procedure pain s/p endoscopy concerning for perforation vs obstruction    -Admit to surgery under Dr. Shelton  -Keep NPO except meds  -IVF  -Pain control prn  -DVT ppx  -Plan for dx lap, possible repair of internal hernia, possible bowel resection tomorrow  -Chlorhexidine wipes

## 2022-05-25 NOTE — PATIENT PROFILE ADULT - FALL HARM RISK - UNIVERSAL INTERVENTIONS
Bed in lowest position, wheels locked, appropriate side rails in place/Call bell, personal items and telephone in reach/Instruct patient to call for assistance before getting out of bed or chair/Non-slip footwear when patient is out of bed/[place] to call system/Physically safe environment - no spills, clutter or unnecessary equipment/Purposeful Proactive Rounding/Room/bathroom lighting operational, light cord in reach

## 2022-05-25 NOTE — H&P ADULT - HISTORY OF PRESENT ILLNESS
46 y.o. F with no sig PMH, PSH Shahid-en-Y gastric bypass 7/2014 (120lb weight loss after surgery), ex-lap, drainage of intraabdominal abscess, removal of mesh ring around gastric pouch 8/2014, incisional hernia repair 11/2015, abd wall mass excision 4/2016, endoscopy April 2022 underwent repeat elective endoscopy today with Dr. Null. Pt was noted to have a sharp turn in the duodenum where Dr. Null was unable to advance, and possibly there is a bezoar beyond the turn. After endoscopy was terminated, pt woke up and had severe abd pain. Pt's pain improved with pain meds but there is concern for perforation, as well as concern for internal hernia requiring surgery. Currently pt feeling better. Denies N/V.

## 2022-05-26 ENCOUNTER — RESULT REVIEW (OUTPATIENT)
Age: 47
End: 2022-05-26

## 2022-05-26 LAB
ANION GAP SERPL CALC-SCNC: 5 MMOL/L — SIGNIFICANT CHANGE UP (ref 5–17)
APTT BLD: 24.1 SEC — LOW (ref 27.5–35.5)
BLD GP AB SCN SERPL QL: SIGNIFICANT CHANGE UP
BUN SERPL-MCNC: 10 MG/DL — SIGNIFICANT CHANGE UP (ref 7–18)
CALCIUM SERPL-MCNC: 9.3 MG/DL — SIGNIFICANT CHANGE UP (ref 8.4–10.5)
CHLORIDE SERPL-SCNC: 105 MMOL/L — SIGNIFICANT CHANGE UP (ref 96–108)
CO2 SERPL-SCNC: 30 MMOL/L — SIGNIFICANT CHANGE UP (ref 22–31)
CREAT SERPL-MCNC: 0.48 MG/DL — LOW (ref 0.5–1.3)
EGFR: 119 ML/MIN/1.73M2 — SIGNIFICANT CHANGE UP
GLUCOSE SERPL-MCNC: 105 MG/DL — HIGH (ref 70–99)
HCT VFR BLD CALC: 37 % — SIGNIFICANT CHANGE UP (ref 34.5–45)
HGB BLD-MCNC: 11.9 G/DL — SIGNIFICANT CHANGE UP (ref 11.5–15.5)
INR BLD: 0.94 RATIO — SIGNIFICANT CHANGE UP (ref 0.88–1.16)
MCHC RBC-ENTMCNC: 32.1 PG — SIGNIFICANT CHANGE UP (ref 27–34)
MCHC RBC-ENTMCNC: 32.2 GM/DL — SIGNIFICANT CHANGE UP (ref 32–36)
MCV RBC AUTO: 99.7 FL — SIGNIFICANT CHANGE UP (ref 80–100)
NRBC # BLD: 0 /100 WBCS — SIGNIFICANT CHANGE UP (ref 0–0)
PLATELET # BLD AUTO: 316 K/UL — SIGNIFICANT CHANGE UP (ref 150–400)
POTASSIUM SERPL-MCNC: 4.8 MMOL/L — SIGNIFICANT CHANGE UP (ref 3.5–5.3)
POTASSIUM SERPL-SCNC: 4.8 MMOL/L — SIGNIFICANT CHANGE UP (ref 3.5–5.3)
PROTHROM AB SERPL-ACNC: 11.2 SEC — SIGNIFICANT CHANGE UP (ref 10.5–13.4)
RBC # BLD: 3.71 M/UL — LOW (ref 3.8–5.2)
RBC # FLD: 12.9 % — SIGNIFICANT CHANGE UP (ref 10.3–14.5)
SODIUM SERPL-SCNC: 140 MMOL/L — SIGNIFICANT CHANGE UP (ref 135–145)
WBC # BLD: 10.38 K/UL — SIGNIFICANT CHANGE UP (ref 3.8–10.5)
WBC # FLD AUTO: 10.38 K/UL — SIGNIFICANT CHANGE UP (ref 3.8–10.5)

## 2022-05-26 PROCEDURE — 44202 LAP ENTERECTOMY: CPT | Mod: 22

## 2022-05-26 PROCEDURE — 88307 TISSUE EXAM BY PATHOLOGIST: CPT | Mod: 26

## 2022-05-26 DEVICE — STAPLER COVIDIEN TRI-STAPLE CURVED 60MM PURPLE RELOAD: Type: IMPLANTABLE DEVICE | Status: FUNCTIONAL

## 2022-05-26 DEVICE — STAPLER COVIDIEN TRI-STAPLE 60MM PURPLE RELOAD: Type: IMPLANTABLE DEVICE | Status: FUNCTIONAL

## 2022-05-26 DEVICE — STAPLER COVIDIEN TRI-STAPLE 60MM TAN RELOAD: Type: IMPLANTABLE DEVICE | Status: FUNCTIONAL

## 2022-05-26 RX ORDER — HYDROMORPHONE HYDROCHLORIDE 2 MG/ML
1 INJECTION INTRAMUSCULAR; INTRAVENOUS; SUBCUTANEOUS EVERY 6 HOURS
Refills: 0 | Status: DISCONTINUED | OUTPATIENT
Start: 2022-05-26 | End: 2022-05-27

## 2022-05-26 RX ORDER — SODIUM CHLORIDE 9 MG/ML
1000 INJECTION INTRAMUSCULAR; INTRAVENOUS; SUBCUTANEOUS
Refills: 0 | Status: DISCONTINUED | OUTPATIENT
Start: 2022-05-26 | End: 2022-05-28

## 2022-05-26 RX ORDER — ACETAMINOPHEN 500 MG
1000 TABLET ORAL ONCE
Refills: 0 | Status: COMPLETED | OUTPATIENT
Start: 2022-05-27 | End: 2022-05-27

## 2022-05-26 RX ORDER — PANTOPRAZOLE SODIUM 20 MG/1
40 TABLET, DELAYED RELEASE ORAL DAILY
Refills: 0 | Status: DISCONTINUED | OUTPATIENT
Start: 2022-05-26 | End: 2022-05-28

## 2022-05-26 RX ORDER — ACETAMINOPHEN 500 MG
1000 TABLET ORAL ONCE
Refills: 0 | Status: DISCONTINUED | OUTPATIENT
Start: 2022-05-26 | End: 2022-05-26

## 2022-05-26 RX ORDER — FENTANYL CITRATE 50 UG/ML
25 INJECTION INTRAVENOUS
Refills: 0 | Status: DISCONTINUED | OUTPATIENT
Start: 2022-05-26 | End: 2022-05-26

## 2022-05-26 RX ORDER — HYDROMORPHONE HYDROCHLORIDE 2 MG/ML
0.5 INJECTION INTRAMUSCULAR; INTRAVENOUS; SUBCUTANEOUS
Refills: 0 | Status: DISCONTINUED | OUTPATIENT
Start: 2022-05-26 | End: 2022-05-26

## 2022-05-26 RX ORDER — DIPHENHYDRAMINE HCL 50 MG
25 CAPSULE ORAL ONCE
Refills: 0 | Status: COMPLETED | OUTPATIENT
Start: 2022-05-26 | End: 2022-05-27

## 2022-05-26 RX ORDER — ENOXAPARIN SODIUM 100 MG/ML
40 INJECTION SUBCUTANEOUS EVERY 24 HOURS
Refills: 0 | Status: DISCONTINUED | OUTPATIENT
Start: 2022-05-27 | End: 2022-05-28

## 2022-05-26 RX ORDER — ONDANSETRON 8 MG/1
4 TABLET, FILM COATED ORAL EVERY 8 HOURS
Refills: 0 | Status: DISCONTINUED | OUTPATIENT
Start: 2022-05-26 | End: 2022-05-28

## 2022-05-26 RX ORDER — ONDANSETRON 8 MG/1
4 TABLET, FILM COATED ORAL ONCE
Refills: 0 | Status: DISCONTINUED | OUTPATIENT
Start: 2022-05-26 | End: 2022-05-26

## 2022-05-26 RX ADMIN — HYDROMORPHONE HYDROCHLORIDE 0.5 MILLIGRAM(S): 2 INJECTION INTRAMUSCULAR; INTRAVENOUS; SUBCUTANEOUS at 03:02

## 2022-05-26 RX ADMIN — HYDROMORPHONE HYDROCHLORIDE 0.5 MILLIGRAM(S): 2 INJECTION INTRAMUSCULAR; INTRAVENOUS; SUBCUTANEOUS at 10:57

## 2022-05-26 RX ADMIN — PANTOPRAZOLE SODIUM 40 MILLIGRAM(S): 20 TABLET, DELAYED RELEASE ORAL at 11:04

## 2022-05-26 RX ADMIN — HYDROMORPHONE HYDROCHLORIDE 1 MILLIGRAM(S): 2 INJECTION INTRAMUSCULAR; INTRAVENOUS; SUBCUTANEOUS at 19:56

## 2022-05-26 RX ADMIN — HYDROMORPHONE HYDROCHLORIDE 0.5 MILLIGRAM(S): 2 INJECTION INTRAMUSCULAR; INTRAVENOUS; SUBCUTANEOUS at 10:33

## 2022-05-26 RX ADMIN — CHLORHEXIDINE GLUCONATE 1 APPLICATION(S): 213 SOLUTION TOPICAL at 10:50

## 2022-05-26 RX ADMIN — HYDROMORPHONE HYDROCHLORIDE 1 MILLIGRAM(S): 2 INJECTION INTRAMUSCULAR; INTRAVENOUS; SUBCUTANEOUS at 19:26

## 2022-05-26 RX ADMIN — HYDROMORPHONE HYDROCHLORIDE 0.5 MILLIGRAM(S): 2 INJECTION INTRAMUSCULAR; INTRAVENOUS; SUBCUTANEOUS at 03:20

## 2022-05-26 NOTE — ASSESSMENT
[FreeTextEntry1] : This is a 46 year female here for post hospital discharge of abdominal pain\par \par Patients pain may be due to stricture at the JJ anastomosis site and causing food to get trapped. \par \par My plan\par -MiraLAX\par -CT enterography to reassess the small bowel\par -EGD post imaging\par -covid swab\par \par Risks, benefits, and alternatives of EGD and discussed at length with patient including but not limited to bleeding perforation, anesthesia related complication, aspiration, etc. Patient expressed understanding.\par \par

## 2022-05-26 NOTE — HISTORY OF PRESENT ILLNESS
[de-identified] : This is a 46 year female here for post hospital discharge of abdominal pain. PMH of Shahid-En-y gastric bypass in 2016. CT A/P from 4/27/22 showed mildly distended JJ anastomosis containing focalized material. EGD from 4/29/22 failed attempt due to unable to reach the anastomosis site secondary to severe kink in the small bowel. Since discharge from the hospital she is c/o periumbilical pain. She has been on clear diet since discharge. Lost 16 pounds in 3 weeks. Having formed BMs every 3 days. Denies any difficulty swallowing or reflux. Has some nausea but denies vomiting. Denies any blood or dark tarry stools. Normal stool caliber. \par Last colonoscopy 4-5 years go and normal per patient. \par Smoke/Etoh: none

## 2022-05-26 NOTE — PHYSICAL EXAM
[General Appearance - Alert] : alert [General Appearance - In No Acute Distress] : in no acute distress [Bowel Sounds] : normal bowel sounds [Abdomen Soft] : soft [Abdomen Mass (___ Cm)] : no abdominal mass palpated [No CVA Tenderness] : no ~M costovertebral angle tenderness [No Spinal Tenderness] : no spinal tenderness [Abnormal Walk] : normal gait [Nail Clubbing] : no clubbing  or cyanosis of the fingernails [Musculoskeletal - Swelling] : no joint swelling seen [Motor Tone] : muscle strength and tone were normal [Skin Color & Pigmentation] : normal skin color and pigmentation [Skin Turgor] : normal skin turgor [] : no rash [Deep Tendon Reflexes (DTR)] : deep tendon reflexes were 2+ and symmetric [Sensation] : the sensory exam was normal to light touch and pinprick [No Focal Deficits] : no focal deficits [Oriented To Time, Place, And Person] : oriented to person, place, and time [Impaired Insight] : insight and judgment were intact [Affect] : the affect was normal [FreeTextEntry1] : periumbilical tenderness.

## 2022-05-26 NOTE — BRIEF OPERATIVE NOTE - NSICDXBRIEFPROCEDURE_GEN_ALL_CORE_FT
PROCEDURES:  Laparoscopic lysis of abdominal adhesions 26-May-2022 17:43:37  Ruslan Gamboa  Small bowel resection 26-May-2022 17:43:50  Ruslan Gamboa

## 2022-05-27 LAB
ANION GAP SERPL CALC-SCNC: 5 MMOL/L — SIGNIFICANT CHANGE UP (ref 5–17)
BUN SERPL-MCNC: 8 MG/DL — SIGNIFICANT CHANGE UP (ref 7–18)
CALCIUM SERPL-MCNC: 8.9 MG/DL — SIGNIFICANT CHANGE UP (ref 8.4–10.5)
CHLORIDE SERPL-SCNC: 106 MMOL/L — SIGNIFICANT CHANGE UP (ref 96–108)
CO2 SERPL-SCNC: 30 MMOL/L — SIGNIFICANT CHANGE UP (ref 22–31)
CREAT SERPL-MCNC: 0.57 MG/DL — SIGNIFICANT CHANGE UP (ref 0.5–1.3)
EGFR: 114 ML/MIN/1.73M2 — SIGNIFICANT CHANGE UP
GLUCOSE SERPL-MCNC: 95 MG/DL — SIGNIFICANT CHANGE UP (ref 70–99)
HCT VFR BLD CALC: 33 % — LOW (ref 34.5–45)
HGB BLD-MCNC: 10.7 G/DL — LOW (ref 11.5–15.5)
MAGNESIUM SERPL-MCNC: 1.6 MG/DL — SIGNIFICANT CHANGE UP (ref 1.6–2.6)
MCHC RBC-ENTMCNC: 32.2 PG — SIGNIFICANT CHANGE UP (ref 27–34)
MCHC RBC-ENTMCNC: 32.4 GM/DL — SIGNIFICANT CHANGE UP (ref 32–36)
MCV RBC AUTO: 99.4 FL — SIGNIFICANT CHANGE UP (ref 80–100)
NRBC # BLD: 0 /100 WBCS — SIGNIFICANT CHANGE UP (ref 0–0)
PHOSPHATE SERPL-MCNC: 5.2 MG/DL — HIGH (ref 2.5–4.5)
PLATELET # BLD AUTO: 278 K/UL — SIGNIFICANT CHANGE UP (ref 150–400)
POTASSIUM SERPL-MCNC: 4.9 MMOL/L — SIGNIFICANT CHANGE UP (ref 3.5–5.3)
POTASSIUM SERPL-SCNC: 4.9 MMOL/L — SIGNIFICANT CHANGE UP (ref 3.5–5.3)
RBC # BLD: 3.32 M/UL — LOW (ref 3.8–5.2)
RBC # FLD: 13.2 % — SIGNIFICANT CHANGE UP (ref 10.3–14.5)
SODIUM SERPL-SCNC: 141 MMOL/L — SIGNIFICANT CHANGE UP (ref 135–145)
WBC # BLD: 14.21 K/UL — HIGH (ref 3.8–10.5)
WBC # FLD AUTO: 14.21 K/UL — HIGH (ref 3.8–10.5)

## 2022-05-27 PROCEDURE — 99232 SBSQ HOSP IP/OBS MODERATE 35: CPT

## 2022-05-27 RX ORDER — KETOROLAC TROMETHAMINE 30 MG/ML
30 SYRINGE (ML) INJECTION ONCE
Refills: 0 | Status: DISCONTINUED | OUTPATIENT
Start: 2022-05-27 | End: 2022-05-27

## 2022-05-27 RX ORDER — MAGNESIUM SULFATE 500 MG/ML
1 VIAL (ML) INJECTION ONCE
Refills: 0 | Status: COMPLETED | OUTPATIENT
Start: 2022-05-27 | End: 2022-05-27

## 2022-05-27 RX ORDER — DIPHENHYDRAMINE HCL 50 MG
25 CAPSULE ORAL ONCE
Refills: 0 | Status: COMPLETED | OUTPATIENT
Start: 2022-05-27 | End: 2022-05-27

## 2022-05-27 RX ORDER — HYDROMORPHONE HYDROCHLORIDE 2 MG/ML
0.5 INJECTION INTRAMUSCULAR; INTRAVENOUS; SUBCUTANEOUS THREE TIMES A DAY
Refills: 0 | Status: DISCONTINUED | OUTPATIENT
Start: 2022-05-27 | End: 2022-05-28

## 2022-05-27 RX ORDER — TRAMADOL HYDROCHLORIDE 50 MG/1
25 TABLET ORAL ONCE
Refills: 0 | Status: DISCONTINUED | OUTPATIENT
Start: 2022-05-27 | End: 2022-05-27

## 2022-05-27 RX ADMIN — Medication 400 MILLIGRAM(S): at 00:18

## 2022-05-27 RX ADMIN — PANTOPRAZOLE SODIUM 40 MILLIGRAM(S): 20 TABLET, DELAYED RELEASE ORAL at 12:18

## 2022-05-27 RX ADMIN — HYDROMORPHONE HYDROCHLORIDE 0.5 MILLIGRAM(S): 2 INJECTION INTRAMUSCULAR; INTRAVENOUS; SUBCUTANEOUS at 19:51

## 2022-05-27 RX ADMIN — Medication 30 MILLIGRAM(S): at 05:50

## 2022-05-27 RX ADMIN — SODIUM CHLORIDE 75 MILLILITER(S): 9 INJECTION INTRAMUSCULAR; INTRAVENOUS; SUBCUTANEOUS at 05:22

## 2022-05-27 RX ADMIN — HYDROMORPHONE HYDROCHLORIDE 1 MILLIGRAM(S): 2 INJECTION INTRAMUSCULAR; INTRAVENOUS; SUBCUTANEOUS at 01:33

## 2022-05-27 RX ADMIN — Medication 1000 MILLIGRAM(S): at 13:58

## 2022-05-27 RX ADMIN — Medication 1000 MILLIGRAM(S): at 05:52

## 2022-05-27 RX ADMIN — Medication 25 MILLIGRAM(S): at 23:41

## 2022-05-27 RX ADMIN — Medication 400 MILLIGRAM(S): at 12:18

## 2022-05-27 RX ADMIN — HYDROMORPHONE HYDROCHLORIDE 1 MILLIGRAM(S): 2 INJECTION INTRAMUSCULAR; INTRAVENOUS; SUBCUTANEOUS at 10:16

## 2022-05-27 RX ADMIN — Medication 25 MILLIGRAM(S): at 00:19

## 2022-05-27 RX ADMIN — HYDROMORPHONE HYDROCHLORIDE 1 MILLIGRAM(S): 2 INJECTION INTRAMUSCULAR; INTRAVENOUS; SUBCUTANEOUS at 09:37

## 2022-05-27 RX ADMIN — HYDROMORPHONE HYDROCHLORIDE 0.5 MILLIGRAM(S): 2 INJECTION INTRAMUSCULAR; INTRAVENOUS; SUBCUTANEOUS at 15:04

## 2022-05-27 RX ADMIN — Medication 100 GRAM(S): at 09:30

## 2022-05-27 RX ADMIN — HYDROMORPHONE HYDROCHLORIDE 0.5 MILLIGRAM(S): 2 INJECTION INTRAMUSCULAR; INTRAVENOUS; SUBCUTANEOUS at 16:00

## 2022-05-27 RX ADMIN — ENOXAPARIN SODIUM 40 MILLIGRAM(S): 100 INJECTION SUBCUTANEOUS at 05:22

## 2022-05-27 RX ADMIN — Medication 400 MILLIGRAM(S): at 17:46

## 2022-05-27 RX ADMIN — Medication 30 MILLIGRAM(S): at 06:20

## 2022-05-27 RX ADMIN — HYDROMORPHONE HYDROCHLORIDE 1 MILLIGRAM(S): 2 INJECTION INTRAMUSCULAR; INTRAVENOUS; SUBCUTANEOUS at 02:03

## 2022-05-27 RX ADMIN — Medication 1000 MILLIGRAM(S): at 00:30

## 2022-05-27 RX ADMIN — HYDROMORPHONE HYDROCHLORIDE 0.5 MILLIGRAM(S): 2 INJECTION INTRAMUSCULAR; INTRAVENOUS; SUBCUTANEOUS at 20:21

## 2022-05-27 RX ADMIN — Medication 400 MILLIGRAM(S): at 05:22

## 2022-05-27 RX ADMIN — SODIUM CHLORIDE 75 MILLILITER(S): 9 INJECTION INTRAMUSCULAR; INTRAVENOUS; SUBCUTANEOUS at 08:38

## 2022-05-28 ENCOUNTER — TRANSCRIPTION ENCOUNTER (OUTPATIENT)
Age: 47
End: 2022-05-28

## 2022-05-28 VITALS
DIASTOLIC BLOOD PRESSURE: 72 MMHG | HEART RATE: 72 BPM | OXYGEN SATURATION: 99 % | RESPIRATION RATE: 16 BRPM | TEMPERATURE: 98 F | SYSTOLIC BLOOD PRESSURE: 117 MMHG

## 2022-05-28 LAB
ANION GAP SERPL CALC-SCNC: 7 MMOL/L — SIGNIFICANT CHANGE UP (ref 5–17)
BUN SERPL-MCNC: 7 MG/DL — SIGNIFICANT CHANGE UP (ref 7–18)
CALCIUM SERPL-MCNC: 8.7 MG/DL — SIGNIFICANT CHANGE UP (ref 8.4–10.5)
CHLORIDE SERPL-SCNC: 103 MMOL/L — SIGNIFICANT CHANGE UP (ref 96–108)
CO2 SERPL-SCNC: 29 MMOL/L — SIGNIFICANT CHANGE UP (ref 22–31)
CREAT SERPL-MCNC: 0.37 MG/DL — LOW (ref 0.5–1.3)
EGFR: 127 ML/MIN/1.73M2 — SIGNIFICANT CHANGE UP
GLUCOSE SERPL-MCNC: 66 MG/DL — LOW (ref 70–99)
HCT VFR BLD CALC: 31.3 % — LOW (ref 34.5–45)
HGB BLD-MCNC: 10 G/DL — LOW (ref 11.5–15.5)
MAGNESIUM SERPL-MCNC: 1.8 MG/DL — SIGNIFICANT CHANGE UP (ref 1.6–2.6)
MCHC RBC-ENTMCNC: 31.9 GM/DL — LOW (ref 32–36)
MCHC RBC-ENTMCNC: 32.5 PG — SIGNIFICANT CHANGE UP (ref 27–34)
MCV RBC AUTO: 101.6 FL — HIGH (ref 80–100)
NRBC # BLD: 0 /100 WBCS — SIGNIFICANT CHANGE UP (ref 0–0)
PHOSPHATE SERPL-MCNC: 3.3 MG/DL — SIGNIFICANT CHANGE UP (ref 2.5–4.5)
PLATELET # BLD AUTO: 249 K/UL — SIGNIFICANT CHANGE UP (ref 150–400)
POTASSIUM SERPL-MCNC: 4.4 MMOL/L — SIGNIFICANT CHANGE UP (ref 3.5–5.3)
POTASSIUM SERPL-SCNC: 4.4 MMOL/L — SIGNIFICANT CHANGE UP (ref 3.5–5.3)
RBC # BLD: 3.08 M/UL — LOW (ref 3.8–5.2)
RBC # FLD: 13.6 % — SIGNIFICANT CHANGE UP (ref 10.3–14.5)
SODIUM SERPL-SCNC: 139 MMOL/L — SIGNIFICANT CHANGE UP (ref 135–145)
WBC # BLD: 10.58 K/UL — HIGH (ref 3.8–10.5)
WBC # FLD AUTO: 10.58 K/UL — HIGH (ref 3.8–10.5)

## 2022-05-28 PROCEDURE — 85610 PROTHROMBIN TIME: CPT

## 2022-05-28 PROCEDURE — 88307 TISSUE EXAM BY PATHOLOGIST: CPT

## 2022-05-28 PROCEDURE — 83735 ASSAY OF MAGNESIUM: CPT

## 2022-05-28 PROCEDURE — 80048 BASIC METABOLIC PNL TOTAL CA: CPT

## 2022-05-28 PROCEDURE — 84100 ASSAY OF PHOSPHORUS: CPT

## 2022-05-28 PROCEDURE — 86900 BLOOD TYPING SEROLOGIC ABO: CPT

## 2022-05-28 PROCEDURE — 81025 URINE PREGNANCY TEST: CPT

## 2022-05-28 PROCEDURE — C1889: CPT

## 2022-05-28 PROCEDURE — 86850 RBC ANTIBODY SCREEN: CPT

## 2022-05-28 PROCEDURE — 86901 BLOOD TYPING SEROLOGIC RH(D): CPT

## 2022-05-28 PROCEDURE — 71045 X-RAY EXAM CHEST 1 VIEW: CPT

## 2022-05-28 PROCEDURE — 85730 THROMBOPLASTIN TIME PARTIAL: CPT

## 2022-05-28 PROCEDURE — 36415 COLL VENOUS BLD VENIPUNCTURE: CPT

## 2022-05-28 PROCEDURE — 85027 COMPLETE CBC AUTOMATED: CPT

## 2022-05-28 PROCEDURE — 87635 SARS-COV-2 COVID-19 AMP PRB: CPT

## 2022-05-28 RX ORDER — TRAMADOL HYDROCHLORIDE 50 MG/1
1 TABLET ORAL
Qty: 6 | Refills: 0
Start: 2022-05-28

## 2022-05-28 RX ADMIN — PANTOPRAZOLE SODIUM 40 MILLIGRAM(S): 20 TABLET, DELAYED RELEASE ORAL at 11:02

## 2022-05-28 RX ADMIN — HYDROMORPHONE HYDROCHLORIDE 0.5 MILLIGRAM(S): 2 INJECTION INTRAMUSCULAR; INTRAVENOUS; SUBCUTANEOUS at 03:12

## 2022-05-28 RX ADMIN — HYDROMORPHONE HYDROCHLORIDE 0.5 MILLIGRAM(S): 2 INJECTION INTRAMUSCULAR; INTRAVENOUS; SUBCUTANEOUS at 07:56

## 2022-05-28 RX ADMIN — HYDROMORPHONE HYDROCHLORIDE 0.5 MILLIGRAM(S): 2 INJECTION INTRAMUSCULAR; INTRAVENOUS; SUBCUTANEOUS at 03:42

## 2022-05-28 RX ADMIN — ENOXAPARIN SODIUM 40 MILLIGRAM(S): 100 INJECTION SUBCUTANEOUS at 05:38

## 2022-05-28 RX ADMIN — HYDROMORPHONE HYDROCHLORIDE 0.5 MILLIGRAM(S): 2 INJECTION INTRAMUSCULAR; INTRAVENOUS; SUBCUTANEOUS at 12:41

## 2022-05-28 RX ADMIN — HYDROMORPHONE HYDROCHLORIDE 0.5 MILLIGRAM(S): 2 INJECTION INTRAMUSCULAR; INTRAVENOUS; SUBCUTANEOUS at 13:11

## 2022-05-28 RX ADMIN — HYDROMORPHONE HYDROCHLORIDE 0.5 MILLIGRAM(S): 2 INJECTION INTRAMUSCULAR; INTRAVENOUS; SUBCUTANEOUS at 07:26

## 2022-05-28 NOTE — DISCHARGE NOTE PROVIDER - HOSPITAL COURSE
46 y.o. F with no sig PMH, PSH Shahid-en-Y gastric bypass 7/2014 (120lb weight loss after surgery), ex-lap, drainage of intraabdominal abscess, removal of mesh ring around gastric pouch 8/2014, incisional hernia repair 11/2015, abd wall mass excision 4/2016, endoscopy April 2022 underwent repeat elective endoscopy with Dr. Null on 5/25. Pt was noted to have a sharp turn in the duodenum where Dr. Null was unable to advance, and possibly there is a bezoar beyond the turn. After endoscopy was terminated, pt woke up and had severe abd pain. Pt's pain improved with pain meds but there is concern for perforation, as well as concern for internal hernia requiring surgery.     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 After extensive lysis of adhesions, bezoar was milked into J-J anastomosis and J-J was resected, and re-anastomosed. Patient returned to the floor post operatively, remained hemodynamically stable. Tolerated diet, passed flatus. Patient was subsequently discharged in stable condition on 5/28, POD2.

## 2022-05-28 NOTE — DISCHARGE NOTE NURSING/CASE MANAGEMENT/SOCIAL WORK - NSDCPEFALRISK_GEN_ALL_CORE
For information on Fall & Injury Prevention, visit: [email or web address] OR  [email or web address] OR  https://www.cdc.gov/steadi/patient.html

## 2022-05-28 NOTE — PROGRESS NOTE ADULT - ASSESSMENT
46y.o. Female s/p dx lap, small bowel resection    -Advance diet in AM  -Pain control prn  -DVT ppx  -Incentive spirometry  
This is a 46 female  POD#1 s/p laparoscopic lysis of adhesions, small bowel resection and removal of bezoar  -S/p EGD on 5/25/22 with Peds colonoscope advanced with some difficulty to the area reached on previous scope where the severe sharp turn was encountered. A quick glimpse beyond this point was achieved and it appeared as though some type of bezoar was present. The scope immediately retracted back and was unable to visualize again.   -pain control  -ambulate  -plan as per primary team  -follow up with Dr. Null in 1-2 weeks post discharge   
46y.o. Female s/p incomplete endoscopy, r/o bezoar vs obstruction    -Dx lap today  -Keep NPO except meds  -IVF  -Pain control prn  -DVT ppx
46F  POD#1 s/p laparoscopic lysis of adhesions, small bowel resection and removal of Bezoar  -pain control  -ambulate  -incentive spirometry  -monitor bowel function  -DC coker  -Cont ernestina richmond  -Dispo planning
46F POD2 s/p diagnostic laparoscopy, lysis of adhesions, J-J resection and re-anastomosis  -pain control  -ambulation, pain control, IS, OOBTC  -ernestina fulls  -DC planning

## 2022-05-28 NOTE — DISCHARGE NOTE NURSING/CASE MANAGEMENT/SOCIAL WORK - PATIENT PORTAL LINK FT
You can access the FollowMyHealth Patient Portal offered by [facility] by registering at the following website: [email or web address]. By joining [email or web address] FollowMyHealth portal, you will also be able to view your health information using other applications (apps) compatible with our system.

## 2022-05-28 NOTE — DISCHARGE NOTE PROVIDER - NSDCCPTREATMENT_GEN_ALL_CORE_FT
PRINCIPAL PROCEDURE  Procedure: Laparoscopic lysis of abdominal adhesions  Findings and Treatment: You underwent a procedure to remove obstructive bands and to remove an area of the small intestine that was narrowed. You were also found to have a large amount of undigested material within your small intestine which was also removed. The procedure went well and you have been stable.  Please do not engage in any heavy lifting >15 lbs for at least 6 weeks. Please take tylenol at home for pain as needed. Please avoid taking NSAIDs, such as ibuprofen (motrin, alleve, advil, etc), as these medications can slow your recovery. Continue the bariatric fulls. Continue to ambulate as much as possible. The adhesive strips in place over your surgical sites will fall off on their own. You are able to shower normally and pat the areas dry.  Please call your surgeon for a 1 week follow up appointment.

## 2022-05-28 NOTE — PROGRESS NOTE ADULT - SUBJECTIVE AND OBJECTIVE BOX
INTERVAL HPI/OVERNIGHT EVENTS:  Pt resting comfortably. No acute complaints.   States no pain overnight.  NPO.  Denies N/V.    MEDICATIONS  (STANDING):  chlorhexidine 2% Cloths 1 Application(s) Topical once  lactated ringers. 1000 milliLiter(s) (125 mL/Hr) IV Continuous <Continuous>  pantoprazole  Injectable 40 milliGRAM(s) IV Push daily    MEDICATIONS  (PRN):  HYDROmorphone  Injectable 0.5 milliGRAM(s) IV Push every 3 hours PRN Severe Pain (7 - 10)  ketorolac   Injectable 30 milliGRAM(s) IV Push every 6 hours PRN Moderate Pain (4 - 6)  ondansetron Injectable 4 milliGRAM(s) IV Push every 6 hours PRN Nausea    Vital Signs Last 24 Hrs  T(C): 36.3 (26 May 2022 05:00), Max: 36.8 (25 May 2022 18:43)  T(F): 97.3 (26 May 2022 05:00), Max: 98.3 (25 May 2022 18:43)  HR: 57 (26 May 2022 05:00) (57 - 69)  BP: 113/63 (26 May 2022 05:00) (113/63 - 126/80)  BP(mean): --  RR: 18 (26 May 2022 05:00) (18 - 18)  SpO2: 100% (26 May 2022 05:00) (96% - 100%)    Physical:  General: A&Ox3. NAD.  Abdomen: Soft nondistended, nontender.    LABS:                        11.9   10.38 )-----------( 316      ( 26 May 2022 04:20 )             37.0             05-26    140  |  105  |  10  ----------------------------<  105<H>  4.8   |  30  |  0.48<L>    Ca    9.3      26 May 2022 04:20    
Bariatric Surgery    Subjective:  Pt resting comfortably.   c/o incisional pain, controlled with meds.  Tolerating water.  Denies N/V  Voided post op.    T(C): 36.6 (05-26-22 @ 23:52), Max: 37.2 (05-26-22 @ 21:33)  HR: 69 (05-26-22 @ 23:52) (57 - 99)  BP: 100/56 (05-26-22 @ 23:52) (100/56 - 121/37)  RR: 18 (05-26-22 @ 23:52) (12 - 18)  SpO2: 99% (05-26-22 @ 23:52) (95% - 100%)    Physical:  Gen: A&O x3  Abd: Soft ND, Dressing C/D/I    
Patient seen and examined at bedside. No acute issues overnight. Tolerating ernestina clears, pain controlled, ambulating, passing flatus.    Vital Signs Last 24 Hrs  T(C): 37 (28 May 2022 05:00), Max: 37 (28 May 2022 05:00)  T(F): 98.6 (28 May 2022 05:00), Max: 98.6 (28 May 2022 05:00)  HR: 65 (28 May 2022 05:00) (65 - 76)  BP: 108/63 (28 May 2022 05:00) (108/63 - 123/78)  BP(mean): --  RR: 18 (28 May 2022 05:00) (18 - 18)  SpO2: 98% (28 May 2022 05:00) (96% - 99%)    General: NAD  Cardio: RRR  Resp: normal resp effort  Abd: soft, appropriately tender, nondistended                          10.0   10.58 )-----------( 249      ( 28 May 2022 07:07 )             31.3   05-28    139  |  103  |  7   ----------------------------<  66<L>  4.4   |  29  |  0.37<L>    Ca    8.7      28 May 2022 07:07  Phos  3.3     05-28  Mg     1.8     05-28    
    GI PROGRESS NOTE    Patient is a 46y old  Female who presents with a chief complaint of abdominal pain s/p endoscopy (27 May 2022 08:16)      HPI:  46 y.o. F with no sig PMH, PSH Shahid-en-Y gastric bypass 2014 (120lb weight loss after surgery), ex-lap, drainage of intraabdominal abscess, removal of mesh ring around gastric pouch 2014, incisional hernia repair 2015, abd wall mass excision 2016, endoscopy 2022 underwent repeat elective endoscopy today with Dr. Null. Pt was noted to have a sharp turn in the duodenum where Dr. Null was unable to advance, and possibly there is a bezoar beyond the turn. After endoscopy was terminated, pt woke up and had severe abd pain. Pt's pain improved with pain meds but there is concern for perforation, as well as concern for internal hernia requiring surgery. Currently pt feeling better. Denies N/V. (25 May 2022 18:49)      GI HPI   Patient in bed and resting . S/P small bowel resection. Patient ding well. Has some incisional pain. Tolerating clears.     ______________________________________________________________________  PMH/PSH:  PAST MEDICAL & SURGICAL HISTORY:  Narcolepsy      GERD (gastroesophageal reflux disease)      Obesity      JEFFREY (obstructive sleep apnea)  resolved after gastric bypass      Depression      Anxiety      Scoliosis of lumbar spine      Renal stone      S/P  section  x 2      S/P cholecystectomy      S/P vein stripping  right leg      H/O gastric bypass  2014 gastrostomy, lysis of adhesions      Perforated viscus  exploratory drainage of intraabdominal abcess and removal of mesh ring from around gastric pouch on 8/3/2014        ______________________________________________________________________  MEDS:  MEDICATIONS  (STANDING):  acetaminophen   IVPB .. 1000 milliGRAM(s) IV Intermittent once  acetaminophen   IVPB .. 1000 milliGRAM(s) IV Intermittent once  enoxaparin Injectable 40 milliGRAM(s) SubCutaneous every 24 hours  pantoprazole  Injectable 40 milliGRAM(s) IV Push daily  sodium chloride 0.9%. 1000 milliLiter(s) (75 mL/Hr) IV Continuous <Continuous>    MEDICATIONS  (PRN):  HYDROmorphone  Injectable 0.5 milliGRAM(s) IV Push three times a day PRN Severe Pain (7 - 10)  ondansetron Injectable 4 milliGRAM(s) IV Push every 8 hours PRN Nausea and/or Vomiting    ______________________________________________________________________  ALL:   Allergies    No Known Allergies    Intolerances      ______________________________________________________________________  SH: Social History:    ______________________________________________________________________  FH:  FAMILY HISTORY:  Family history of hypertension (Grandparent)      ______________________________________________________________________  ROS:    CONSTITUTIONAL:  No weight loss, fever, chills, weakness or fatigue.    HEENT:  Eyes:  No visual loss, blurred vision, double vision or yellow sclerae. Ears, Nose, Throat:  No hearing loss, sneezing, congestion, runny nose or sore throat.    SKIN:  No rash or itching.    CARDIOVASCULAR:  No chest pain, chest pressure or chest discomfort. No palpitations or edema.    RESPIRATORY:  No shortness of breath, cough or sputum.    GASTROINTESTINAL:  SEE HPI    GENITOURINARY:  No dysuria, hematuria, urinary frequency    NEUROLOGICAL:  No headache, dizziness, syncope, paralysis, ataxia, numbness or tingling in the extremities. No change in bowel or bladder control.    MUSCULOSKELETAL:  No muscle, back pain, joint pain or stiffness.      ______________________________________________________________________  PHYSICAL EXAM:  T(C): 36.2 (22 @ 05:23), Max: 37.2 (22 @ 21:33)  HR: 66 (22 @ 05:23)  BP: 125/71 (22 @ 05:23)  RR: 17 (22 @ 05:23)  SpO2: 100% (22 @ 05:23)  Wt(kg): --      --------------------------------------------------------  IN:    Lactated Ringers Bolus: 2700 mL  Total IN: 2700 mL    OUT:    Indwelling Catheter - Urethral (mL): 1000 mL  Total OUT: 1000 mL    Total NET: 1700 mL        --------------------------------------------------------  IN:  Total IN: 0 mL    OUT:    Indwelling Catheter - Urethral (mL): 1000 mL  Total OUT: 1000 mL    Total NET: -1000 mL          GEN: NAD   CVS- S1 S2  ABD: soft nontender, non distended, bowel sounds+. Incision D/C/I  LUNGS: clear to auscultation  NEURO: noin focal neuro exam; AAO x 3  Extremities: no cyanosis, no calf tenderness, no edema, no clubbing      ______________________________________________________________________  LABS:                        10.7   14.21 )-----------( 278      ( 27 May 2022 07:16 )             33.0     05-27    141  |  106  |  8   ----------------------------<  95  4.9   |  30  |  0.57    Ca    8.9      27 May 2022 07:16  Phos  5.2     05-27  Mg     1.6     05-27        ______________________________________________________________________  IMAGING:    ______________________________________________________________________          
Patient seen and examined at the bedside.  No acute events overnight. POD#1 s/p laparoscopic lysis of adhesions, small bowel resection and removal of Bezoar.  Afebrile, vitals stable.  Incisional pain reported. Tolerated bariatric clears overnight.  Endorse flatus, no bowel movement. Coker in place, making adequate urine.      Vital Signs Last 24 Hrs  T(C): 36.2 (27 May 2022 05:23), Max: 37.2 (26 May 2022 21:33)  T(F): 97.2 (27 May 2022 05:23), Max: 98.9 (26 May 2022 21:33)  HR: 66 (27 May 2022 05:23) (66 - 99)  BP: 125/71 (27 May 2022 05:23) (100/56 - 125/71)  BP(mean): 70 (26 May 2022 21:33) (54 - 83)  RR: 17 (27 May 2022 05:23) (12 - 18)  SpO2: 100% (27 May 2022 05:23) (95% - 100%)    Physical:  GA: AAOx3, no acute distress.    CVS: RRR. no murmurs, rubs or gallops  Pulm: bilteral breath sounds present.  normal inspiratory effort  Abd: soft, nondistended. appropriately tender  : coker in place.                           10.7   14.21 )-----------( 278      ( 27 May 2022 07:16 )             33.0   05-27    141  |  106  |  8   ----------------------------<  95  4.9   |  30  |  0.57    Ca    8.9      27 May 2022 07:16  Phos  5.2     05-27  Mg     1.6     05-27

## 2022-05-28 NOTE — DISCHARGE NOTE PROVIDER - CARE PROVIDER_API CALL
Florian Shelton (MD)  Surgery  [address] [place] [address]  Phone: [phone]  Fax: [phone]  Established Patient  Follow Up Time: 1 week

## 2022-05-28 NOTE — DISCHARGE NOTE PROVIDER - NSDCACTIVITY_GEN_ALL_CORE
Bathing allowed/Showering allowed/Walking - Indoors allowed/No heavy lifting/straining/Walking - Outdoors allowed/Follow Instructions Provided by your Surgical Team

## 2022-06-01 LAB — SURGICAL PATHOLOGY STUDY: SIGNIFICANT CHANGE UP

## 2022-06-07 ENCOUNTER — APPOINTMENT (OUTPATIENT)
Dept: SURGERY | Facility: CLINIC | Age: 47
End: 2022-06-07
Payer: COMMERCIAL

## 2022-06-07 VITALS
HEIGHT: 65 IN | HEART RATE: 72 BPM | SYSTOLIC BLOOD PRESSURE: 104 MMHG | WEIGHT: 150 LBS | DIASTOLIC BLOOD PRESSURE: 71 MMHG | BODY MASS INDEX: 24.99 KG/M2

## 2022-06-07 VITALS — TEMPERATURE: 96.9 F

## 2022-06-07 DIAGNOSIS — Z80.0 FAMILY HISTORY OF MALIGNANT NEOPLASM OF DIGESTIVE ORGANS: ICD-10-CM

## 2022-06-07 PROCEDURE — 99024 POSTOP FOLLOW-UP VISIT: CPT

## 2022-06-08 ENCOUNTER — NON-APPOINTMENT (OUTPATIENT)
Age: 47
End: 2022-06-08

## 2022-06-08 LAB
BASOPHILS # BLD AUTO: 0.07 K/UL
BASOPHILS NFR BLD AUTO: 1.1 %
EOSINOPHIL # BLD AUTO: 0.06 K/UL
EOSINOPHIL NFR BLD AUTO: 1 %
ESTIMATED AVERAGE GLUCOSE: 97 MG/DL
FERRITIN SERPL-MCNC: 54 NG/ML
FOLATE SERPL-MCNC: >20 NG/ML
HBA1C MFR BLD HPLC: 5 %
HCT VFR BLD CALC: 37 %
HGB BLD-MCNC: 11.6 G/DL
IMM GRANULOCYTES NFR BLD AUTO: 0.3 %
LYMPHOCYTES # BLD AUTO: 1.84 K/UL
LYMPHOCYTES NFR BLD AUTO: 30 %
MAN DIFF?: NORMAL
MCHC RBC-ENTMCNC: 31.4 GM/DL
MCHC RBC-ENTMCNC: 31.6 PG
MCV RBC AUTO: 100.8 FL
MONOCYTES # BLD AUTO: 0.47 K/UL
MONOCYTES NFR BLD AUTO: 7.7 %
NEUTROPHILS # BLD AUTO: 3.67 K/UL
NEUTROPHILS NFR BLD AUTO: 59.9 %
PLATELET # BLD AUTO: 575 K/UL
RBC # BLD: 3.67 M/UL
RBC # FLD: 14.1 %
TSH SERPL-ACNC: 1.05 UIU/ML
VIT B12 SERPL-MCNC: 770 PG/ML
WBC # FLD AUTO: 6.13 K/UL

## 2022-06-13 ENCOUNTER — NON-APPOINTMENT (OUTPATIENT)
Age: 47
End: 2022-06-13

## 2022-06-13 NOTE — HISTORY OF PRESENT ILLNESS
[de-identified] : MILENA BARLOW is a 46 year old female who presents in the office for postop visit. Patient was admitted to [facility] with abdominal pain post endoscopy . She underwent a Laparoscopic extensive lysis of adhesions lasting over 1 hour. Small bowel resection of prior jejunojejunostomy. Jejuno-jejunostomy x 2. Removal of small bowel bezoar on 05/26/2022 pathology results are consistent with Status post gastric bypass with jejuno-jejunal anastomosis. Intraluminal obstructing calculus consistent with cholelith. Early mucosal ischemia, transmural vascular congestion and focal. Today patient is doing well, c/o some incisional pain and vomiting yesterday, no vomiting today . Denies any fevers, chills, nausea, vomiting, diarrhea or constipation. Patient able to tolerate regular diet with normal bowel movements. Surgical incisions are healing well. No sign of inflammation or exudate. Patient stated that pain is improved. Patient denies any pain at present time \par  \par \par

## 2022-06-13 NOTE — REVIEW OF SYSTEMS
[Vomiting] : vomiting [Negative] : Heme/Lymph [Abdominal Pain] : no abdominal pain [Constipation] : no constipation [Diarrhea] : no diarrhea [Heartburn] : no heartburn [Melena] : no melena [FreeTextEntry7] : at times

## 2022-06-13 NOTE — ASSESSMENT
[FreeTextEntry1] : MILENA BARLOW is a 46 year old female who underwent a  Laparoscopic extensive lysis of adhesions lasting over 1 hour. Small bowel resection of prior jejunojejunostomy. Jejuno-jejunostomy x 2. Removal of small bowel bezoar on 05/26/2022 \par \par \par Patient is doing well. All surgical incisions are healing well and as expected. There is no evidence of infection or complication, and patient is progressing as expected. Post-operative wound care, activity, restrictions and precautions reinforced.  Pathology results were discussed in detail. Patient was instructed no heavy lifting  4-6 weeks. Patient's questions and concerns addressed to patient's satisfaction. \par

## 2022-06-13 NOTE — PHYSICAL EXAM
[Normal Breath Sounds] : Normal breath sounds [Normal Heart Sounds] : normal heart sounds [Normal Rate and Rhythm] : normal rate and rhythm [Alert] : alert [Oriented to Person] : oriented to person [Oriented to Place] : oriented to place [Oriented to Time] : oriented to time [Calm] : calm [de-identified] : The patient is alert, well-groomed  [de-identified] : Incision sites are healing well

## 2022-06-14 ENCOUNTER — APPOINTMENT (OUTPATIENT)
Dept: BARIATRICS | Facility: CLINIC | Age: 47
End: 2022-06-14
Payer: COMMERCIAL

## 2022-06-14 ENCOUNTER — APPOINTMENT (OUTPATIENT)
Dept: SURGERY | Facility: CLINIC | Age: 47
End: 2022-06-14
Payer: COMMERCIAL

## 2022-06-14 ENCOUNTER — NON-APPOINTMENT (OUTPATIENT)
Age: 47
End: 2022-06-14

## 2022-06-14 VITALS
WEIGHT: 146 LBS | DIASTOLIC BLOOD PRESSURE: 63 MMHG | SYSTOLIC BLOOD PRESSURE: 98 MMHG | HEART RATE: 73 BPM | BODY MASS INDEX: 24.32 KG/M2 | HEIGHT: 65 IN

## 2022-06-14 VITALS — TEMPERATURE: 96.8 F

## 2022-06-14 DIAGNOSIS — R11.2 NAUSEA WITH VOMITING, UNSPECIFIED: ICD-10-CM

## 2022-06-14 DIAGNOSIS — Z56.0 UNEMPLOYMENT, UNSPECIFIED: ICD-10-CM

## 2022-06-14 DIAGNOSIS — K56.609 UNSPECIFIED INTESTINAL OBSTRUCTION, UNSPECIFIED AS TO PARTIAL VERSUS COMPLETE OBSTRUCTION: ICD-10-CM

## 2022-06-14 PROCEDURE — 99213 OFFICE O/P EST LOW 20 MIN: CPT

## 2022-06-14 PROCEDURE — ZZZZZ: CPT

## 2022-06-14 SDOH — ECONOMIC STABILITY - INCOME SECURITY: UNEMPLOYMENT, UNSPECIFIED: Z56.0

## 2022-06-22 LAB — VIT B1 SERPL-MCNC: 124 NMOL/L

## 2022-07-09 NOTE — HISTORY OF PRESENT ILLNESS
[de-identified] : MILENA BARLOW is a 46 year old female who presents in the office for postop visit. She underwent a Laparoscopic extensive lysis of adhesions lasting over 1 hour. Small bowel resection of prior jejunojejunostomy. Jejuno-jejunostomy x 2. Removal of small bowel bezoar on 05/26/2022. Today patient is doing well, c/o nausea and vomiting daily, unable to tolerated phase 3 diet. Patient was advised to stay on liquid diet and take 60 grams of protein. Patient denies any fever, chills. able to have normal bowel movements. 
No. NERI screening performed.  STOP BANG Legend: 0-2 = LOW Risk; 3-4 = INTERMEDIATE Risk; 5-8 = HIGH Risk

## 2022-07-09 NOTE — CONSULT LETTER
[Dear  ___] : Dear  [unfilled], [Courtesy Letter:] : I had the pleasure of seeing your patient, [unfilled], in my office today. [Consult Closing:] : Thank you very much for allowing me to participate in the care of this patient.  If you have any questions, please do not hesitate to contact me. [Sincerely,] : Sincerely, [FreeTextEntry3] : Dr Shelton

## 2022-07-09 NOTE — ASSESSMENT
[FreeTextEntry1] : MILENA BARLOW is a 46 year old female who underwent a  Laparoscopic extensive lysis of adhesions lasting over 1 hour. Small bowel resection of prior jejunojejunostomy. Jejuno-jejunostomy x 2. Removal of small bowel bezoar on 05/26/2022 \par \par \par Patient is doing well. Surgical incisions are healing well and as expected. There is no evidence of infection or complication. \par \par Patient was instructed to stay on liquid diet \par \par RTO 07/12/2022 \par \par In case of an emergency if symptoms worsen please call the office or go to ER

## 2022-07-09 NOTE — PHYSICAL EXAM
[Normal Breath Sounds] : Normal breath sounds [Normal Heart Sounds] : normal heart sounds [Normal Rate and Rhythm] : normal rate and rhythm [Alert] : alert [Oriented to Person] : oriented to person [Oriented to Place] : oriented to place [Oriented to Time] : oriented to time [Calm] : calm [de-identified] : The patient is alert, well-groomed  [de-identified] : Incision sites are healing well

## 2022-07-12 ENCOUNTER — APPOINTMENT (OUTPATIENT)
Dept: SURGERY | Facility: CLINIC | Age: 47
End: 2022-07-12

## 2022-07-12 VITALS
SYSTOLIC BLOOD PRESSURE: 131 MMHG | BODY MASS INDEX: 24.16 KG/M2 | HEIGHT: 65 IN | WEIGHT: 145 LBS | DIASTOLIC BLOOD PRESSURE: 82 MMHG | HEART RATE: 78 BPM

## 2022-07-12 PROCEDURE — 99024 POSTOP FOLLOW-UP VISIT: CPT

## 2022-07-12 NOTE — PLAN
[FreeTextEntry1] : Please follow up at the office  anally for bariatric follow up as needed for any questions or concerns

## 2022-07-12 NOTE — HISTORY OF PRESENT ILLNESS
[de-identified] : MILENA BARLOW is a 46 year old female who presents in the office for postop visit. She underwent a Laparoscopic extensive lysis of adhesions lasting over 1 hour. Small bowel resection of prior jejunojejunostomy. Jejuno-jejunostomy x 2. Removal of small bowel bezoar on 05/26/2022. Patient is doing well, c/o of some dizziness and weakness. She stated that she feels better since last visit. Patient will follow up with PCP for dizziness, Vitals are stable in the office today.

## 2022-07-12 NOTE — ASSESSMENT
[FreeTextEntry1] : MILENA BARLOW is a 46 year old female who underwent a  Laparoscopic extensive lysis of adhesions lasting over 1 hour. Small bowel resection of prior jejunojejunostomy. Jejuno-jejunostomy x 2. Removal of small bowel bezoar on 05/26/2022 \par \par \par Patient is doing well. She stated that she feels better since last visit. She will follow up with PCP for dizziness

## 2022-07-12 NOTE — PHYSICAL EXAM
[Normal Breath Sounds] : Normal breath sounds [Normal Heart Sounds] : normal heart sounds [Normal Rate and Rhythm] : normal rate and rhythm [Alert] : alert [Oriented to Person] : oriented to person [Oriented to Place] : oriented to place [Oriented to Time] : oriented to time [Calm] : calm [de-identified] : The patient is alert, well-groomed  [de-identified] : Incision sites are healing well

## 2022-09-15 ENCOUNTER — APPOINTMENT (OUTPATIENT)
Dept: GASTROENTEROLOGY | Facility: CLINIC | Age: 47
End: 2022-09-15

## 2022-12-01 ENCOUNTER — APPOINTMENT (OUTPATIENT)
Dept: SURGERY | Facility: CLINIC | Age: 47
End: 2022-12-01
Payer: COMMERCIAL

## 2022-12-01 VITALS
HEART RATE: 77 BPM | TEMPERATURE: 98.2 F | DIASTOLIC BLOOD PRESSURE: 87 MMHG | SYSTOLIC BLOOD PRESSURE: 138 MMHG | HEIGHT: 65 IN | OXYGEN SATURATION: 96 % | WEIGHT: 155 LBS | BODY MASS INDEX: 25.83 KG/M2

## 2022-12-01 DIAGNOSIS — Z98.84 BARIATRIC SURGERY STATUS: ICD-10-CM

## 2022-12-01 DIAGNOSIS — D64.9 ANEMIA, UNSPECIFIED: ICD-10-CM

## 2022-12-01 PROCEDURE — 99213 OFFICE O/P EST LOW 20 MIN: CPT

## 2022-12-01 RX ORDER — UBIDECARENONE/VIT E ACET 100MG-5
CAPSULE ORAL
Refills: 0 | Status: ACTIVE | COMMUNITY

## 2022-12-01 RX ORDER — MULTIVIT-MIN/IRON/FOLIC ACID/K 18-600-40
CAPSULE ORAL
Refills: 0 | Status: ACTIVE | COMMUNITY

## 2022-12-01 RX ORDER — DICLOFENAC SODIUM 1% 10 MG/G
1 GEL TOPICAL
Qty: 100 | Refills: 0 | Status: ACTIVE | COMMUNITY
Start: 2022-11-02

## 2022-12-01 RX ORDER — KETOCONAZOLE 20 MG/G
2 CREAM TOPICAL
Qty: 60 | Refills: 0 | Status: ACTIVE | COMMUNITY
Start: 2022-11-02

## 2022-12-01 RX ORDER — DEXLANSOPRAZOLE 60 MG/1
60 CAPSULE, DELAYED RELEASE ORAL
Qty: 30 | Refills: 0 | Status: ACTIVE | COMMUNITY
Start: 2022-10-21

## 2022-12-01 NOTE — ASSESSMENT
[FreeTextEntry1] : MILENA BARLOW is a 46 year old female who underwent a  Laparoscopic extensive lysis of adhesions lasting over 1 hour. Small bowel resection of prior jejunojejunostomy. Jejuno-jejunostomy x 2. Removal of small bowel bezoar on 05/26/2022 \par \par \par Patient is c/o pain and bulging on left lower abdominal wall incision site. Surgical incisions are healed well, left lower abdominal incision site with questionable incisional hernia. \par \par Patient was instructed to take Tylenol as needed for pain. \par \par F/u next Tuesday with Dr Shelton for questionable incisional hernia

## 2022-12-01 NOTE — PHYSICAL EXAM
[Normal Breath Sounds] : Normal breath sounds [Normal Heart Sounds] : normal heart sounds [Normal Rate and Rhythm] : normal rate and rhythm [Alert] : alert [Oriented to Person] : oriented to person [Oriented to Place] : oriented to place [Oriented to Time] : oriented to time [Calm] : calm [de-identified] : The patient is alert, well-groomed  [de-identified] : Incision sites are healing well  [de-identified] : full range of motion and no deformities appreciated.  [de-identified] : Surgical incisions are healed well, left lower abdominal incision site with questionable incisional hernia.

## 2022-12-01 NOTE — HISTORY OF PRESENT ILLNESS
[de-identified] : MILENA BARLOW is a 46 year old female who presents in the office for postop visit. She underwent a Laparoscopic extensive lysis of adhesions lasting over 1 hour. Small bowel resection of prior jejunojejunostomy. Jejuno-jejunostomy x 2. Removal of small bowel bezoar on 05/26/2022. Today, Patient is c/o pain and bulging on left lower abdominal wall incision site. Patient reports that as the day progress she experiencing more discomfort. Patient denies any other discomfort.  Denies any fevers, chills, nausea, vomiting, diarrhea or constipation. Patient able to tolerate regular diet with normal bowel movements. Surgical incisions are healed well, left lower abdominal incision site with questionable incisional hernia.

## 2022-12-01 NOTE — REVIEW OF SYSTEMS
[Negative] : Heme/Lymph [Vomiting] : no vomiting [Constipation] : no constipation [Diarrhea] : no diarrhea [Heartburn] : no heartburn [FreeTextEntry7] : LLQ incisional pain

## 2022-12-06 ENCOUNTER — APPOINTMENT (OUTPATIENT)
Dept: SURGERY | Facility: CLINIC | Age: 47
End: 2022-12-06

## 2022-12-06 VITALS
WEIGHT: 156 LBS | HEART RATE: 90 BPM | DIASTOLIC BLOOD PRESSURE: 85 MMHG | HEIGHT: 65 IN | SYSTOLIC BLOOD PRESSURE: 138 MMHG | BODY MASS INDEX: 25.99 KG/M2

## 2022-12-06 VITALS — TEMPERATURE: 97.3 F

## 2022-12-06 DIAGNOSIS — K43.2 INCISIONAL HERNIA W/OUT OBSTRUCTION OR GANGRENE: ICD-10-CM

## 2022-12-06 PROCEDURE — 99213 OFFICE O/P EST LOW 20 MIN: CPT

## 2022-12-06 NOTE — PLAN
[FreeTextEntry1] : Please follow up at the office in post CT and as needed for any questions or concerns

## 2022-12-06 NOTE — PHYSICAL EXAM
[Normal Breath Sounds] : Normal breath sounds [Normal Heart Sounds] : normal heart sounds [Normal Rate and Rhythm] : normal rate and rhythm [Alert] : alert [Oriented to Person] : oriented to person [Oriented to Place] : oriented to place [Oriented to Time] : oriented to time [Calm] : calm [de-identified] : The patient is alert, well-groomed  [de-identified] : Incision sites are healing well  [de-identified] : full range of motion and no deformities appreciated.  [de-identified] : Surgical incisions are healed well, left lower abdominal incision site with questionable incisional hernia.

## 2022-12-06 NOTE — ASSESSMENT
[FreeTextEntry1] : MILENA BARLOW is a 46 year old female who underwent a  Laparoscopic extensive lysis of adhesions lasting over 1 hour. Small bowel resection of prior jejunojejunostomy. Jejuno-jejunostomy x 2. Removal of small bowel bezoar on 05/26/2022 \par \par \par Patient is c/o pain and bulging on left lower abdominal wall incision site. \par \par CT of the abdomen and pelvis to r/o incisional hernia\par \par Patient's questions and concerns addressed to patient's satisfaction.

## 2022-12-06 NOTE — HISTORY OF PRESENT ILLNESS
[de-identified] : MILENA BARLOW is a 46 year old female who presents in the office for postop visit. She underwent a Laparoscopic extensive lysis of adhesions lasting over 1 hour. Small bowel resection of prior jejunojejunostomy. Jejuno-jejunostomy x 2. Removal of small bowel bezoar on 05/26/2022. Questionable incisional hernia. On exam, no palpable lumps or masses felt, unable to appreciate hernia. CT of the abdomen and pelvis to r/o incisional hernia.

## 2022-12-08 ENCOUNTER — APPOINTMENT (OUTPATIENT)
Dept: GASTROENTEROLOGY | Facility: CLINIC | Age: 47
End: 2022-12-08

## 2022-12-08 LAB
ALBUMIN SERPL ELPH-MCNC: 4.3 G/DL
ALP BLD-CCNC: 140 U/L
ALT SERPL-CCNC: 46 U/L
ANION GAP SERPL CALC-SCNC: 11 MMOL/L
AST SERPL-CCNC: 34 U/L
BILIRUB SERPL-MCNC: 0.4 MG/DL
BUN SERPL-MCNC: 24 MG/DL
CALCIUM SERPL-MCNC: 9.5 MG/DL
CHLORIDE SERPL-SCNC: 105 MMOL/L
CO2 SERPL-SCNC: 26 MMOL/L
CREAT SERPL-MCNC: 0.51 MG/DL
EGFR: 117 ML/MIN/1.73M2
GLUCOSE SERPL-MCNC: 70 MG/DL
POTASSIUM SERPL-SCNC: 4.5 MMOL/L
PROT SERPL-MCNC: 7.3 G/DL
SODIUM SERPL-SCNC: 142 MMOL/L

## 2022-12-20 ENCOUNTER — APPOINTMENT (OUTPATIENT)
Dept: CT IMAGING | Facility: HOSPITAL | Age: 47
End: 2022-12-20

## 2023-01-31 NOTE — ED ADULT TRIAGE NOTE - BP NONINVASIVE SYSTOLIC (MM HG)
"Individual Psychotherapy (PhD/LCSW)    2/1/2023    Site:  Telemed     Patient presents from home in [place], [place] for follow up audiovisual telehealth visit.     Therapeutic Intervention: Met with patient.  Outpatient - Supportive psychotherapy 45 min - CPT Code 25931    Chief complaint/reason for encounter: anxiety     Interval history and content of current session: GAD7: 7. Riky shared that she has been feeling "stressed" recently. She shared that this was mostly due to her getting a part time job with [employer], and training starts next week. She is looking forward to having a job but is also nervous about the training being 40 hours next week. I provided empathic support and encouraged her to talk with her supervisors about any questions that she has. We also discussed the probability of there being breaks during the training. She also noted some anxiety that she has about her bearded dragon, but after speaking with the vet, this has eased. She is looking forward to being able to have more money as well as having more structure to her day. 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 Otherwise, Riky is doing well. She denied suicidal ideation and denied homicidal ideation. She has a follow up appointment with Hemanth Bloom NP on 02/14.     Treatment plan:  Target symptoms: anxiety   Why chosen therapy is appropriate versus another modality: relevant to diagnosis, patient responds to this modality  Outcome monitoring methods: self-report, checklist/rating scale  Therapeutic intervention type: supportive psychotherapy    Risk parameters:  Patient reports no suicidal ideation  Patient reports no homicidal ideation  Patient reports no self-injurious behavior  Patient reports no violent behavior    Verbal deficits: None    Patient's response to intervention:  The patient's response to intervention is " accepting.    Progress toward goals and other mental status changes:  The patient's progress toward goals is fair , good.    Diagnosis:     ICD-10-CM ICD-9-CM   1. IVIS (generalized anxiety disorder)  F41.1 300.02       Plan:  individual psychotherapy and medication management by physician Pt to go to ED or call 911 if symptoms worsen or if she has thoughts of harming self and/or others. Pt verbalized understanding.    Return to clinic: as scheduled    Length of Service (minutes): 45      Each patient to whom he or she provides medical services by telemedicine is: (1) informed of the relationship between the physician and patient and the respective role of any other health care provider with respect to management of the patient; and (2) notified that he or she may decline to receive medical services by telemedicine and may withdraw from such care at any time.      131

## 2023-04-08 NOTE — DIETITIAN INITIAL EVALUATION ADULT - ETIOLOGY
History     Chief Complaint   Patient presents with     Rash     Back Pain     HPI  Jenniffer Rocha is a 68 year old female who presents for evaluation of a rash and right-sided upper abdominal pain.  Pain began 7 days ago.  Rash was first noticed yesterday.  She has had no fever.  She has had some dry heaves.  Pain has been severe and intense.    Allergies:  Allergies   Allergen Reactions     No Known Drug Allergies        Problem List:    Patient Active Problem List    Diagnosis Date Noted     CARDIOVASCULAR SCREENING; LDL GOAL LESS THAN 160 07/08/2015     Priority: Medium     Obesity      Priority: Medium     Problem list name updated by automated process. Provider to review          Past Medical History:    Past Medical History:   Diagnosis Date     Obesity, unspecified      Pure hypercholesterolemia        Past Surgical History:    Past Surgical History:   Procedure Laterality Date     COLONOSCOPY N/A 2/12/2016    Procedure: COLONOSCOPY;  Surgeon: Higinio Roach MD;  Location:  GI     HC DILATION/CURETTAGE DIAG/THER NON OB  1993    simple hyperplasia     ZZC LIGATE FALLOPIAN TUBE      tubal ligation       Family History:    Family History   Problem Relation Age of Onset     Depression Father         Suicide age 21     Cancer Mother         colon cancer dx. age 62     Heart Disease Maternal Grandmother         heart disease     Cancer Paternal Grandmother         liver ca       Social History:  Marital Status:   [2]  Social History     Tobacco Use     Smoking status: Never     Smokeless tobacco: Never   Vaping Use     Vaping status: Never Used     Passive vaping exposure: Yes   Substance Use Topics     Alcohol use: No     Drug use: No        Medications:    HYDROcodone-acetaminophen (NORCO) 5-325 MG tablet  HYDROcodone-acetaminophen (NORCO) 5-325 MG tablet  ondansetron (ZOFRAN ODT) 4 MG ODT tab  valACYclovir (VALTREX) 1000 mg tablet  valACYclovir (VALTREX) 1000 mg tablet  Ascorbic Acid (VITAMIN C  PO)  calcium carbonate (OS-DORIAN 500 MG [place]. CA) 500 MG tablet  Cholecalciferol (VITAMIN D3 PO)  Cod Liver Oil CAPS  famotidine (PEPCID) 20 MG tablet  ST MORA WORT CAPS 300 MG OR          Review of Systems  All other systems are reviewed and are negative    Physical Exam   BP: (!) 157/77  Pulse: 92  Temp: 98.6  F (37  C)  Resp: 18  Weight: 96.2 kg (212 lb)  SpO2: 98 %      Physical Exam  Vitals and nursing note reviewed.   Constitutional:       General: She is not in acute distress.     Appearance: She is well-developed. She is not diaphoretic.   HENT:      Head: Normocephalic and atraumatic.   Eyes:      General: No scleral icterus.     Pupils: Pupils are equal, round, and reactive to light.   Cardiovascular:      Rate and Rhythm: Normal rate and regular rhythm.      Heart sounds: Normal heart sounds. No murmur heard.  Pulmonary:      Effort: No respiratory distress.      Breath sounds: No stridor. No wheezing or rales.   Abdominal:      Palpations: Abdomen is soft.      Tenderness: There is no abdominal tenderness.   Musculoskeletal:         General: No tenderness.      Cervical back: Normal range of motion and neck supple.   Skin:     General: Skin is warm and dry.      Coloration: Skin is not pale ().      Findings: Rash ( vesicular rash to right upper quadrant of abdomen and right mid back to midline c/w shingles.  No signs of secondary infection.  ) present. No erythema.   Neurological:      Mental Status: She is alert.         ED Course                 Procedures                  Results for orders placed or performed during the hospital encounter of 04/08/23 (from the past 24 hour(s))   CBC with platelets differential    Narrative    The following orders were created for panel order CBC with platelets differential.  Procedure                               Abnormality         Status                     ---------                               -----------         ------                     CBC with platelets  and nirmal.[[id number]]  Abnormal            Final result               Manual Differential[[id number]]          Abnormal            Final result                 Please view results for these tests on the individual orders.   Comprehensive metabolic panel   Result Value Ref Range    Sodium 128 (L) 136 - 145 mmol/L    Potassium 3.9 3.4 - 5.3 mmol/L    Chloride 94 (L) 98 - 107 mmol/L    Carbon Dioxide (CO2) 26 22 - 29 mmol/L    Anion Gap 8 7 - 15 mmol/L    Urea Nitrogen 8.6 8.0 - 23.0 mg/dL    Creatinine 0.47 (L) 0.51 - 0.95 mg/dL    Calcium 9.3 8.8 - 10.2 mg/dL    Glucose 135 (H) 70 - 99 mg/dL    Alkaline Phosphatase 51 35 - 104 U/L    AST 32 10 - 35 U/L    ALT 47 (H) 10 - 35 U/L    Protein Total 6.7 6.4 - 8.3 g/dL    Albumin 3.8 3.5 - 5.2 g/dL    Bilirubin Total 0.3 <=1.2 mg/dL    GFR Estimate >90 >60 mL/min/1.73m2   CBC with platelets and differential   Result Value Ref Range    WBC Count 5.1 4.0 - 11.0 10e3/uL    RBC Count 4.49 3.80 - 5.20 10e6/uL    Hemoglobin 14.2 11.7 - 15.7 g/dL    Hematocrit 42.0 35.0 - 47.0 %    MCV 94 78 - 100 fL    MCH 31.6 26.5 - 33.0 pg    MCHC 33.8 31.5 - 36.5 g/dL    RDW 12.0 10.0 - 15.0 %    Platelet Count 111 (L) 150 - 450 10e3/uL   Manual Differential   Result Value Ref Range    % Neutrophils 75 %    % Lymphocytes 21 %    % Monocytes 4 %    % Eosinophils 0 %    % Basophils 0 %    Absolute Neutrophils 3.8 1.6 - 8.3 10e3/uL    Absolute Lymphocytes 1.1 0.8 - 5.3 10e3/uL    Absolute Monocytes 0.2 0.0 - 1.3 10e3/uL    Absolute Eosinophils 0.0 0.0 - 0.7 10e3/uL    Absolute Basophils 0.0 0.0 - 0.2 10e3/uL    RBC Morphology Confirmed RBC Indices     Platelet Assessment  Automated Count Confirmed. Platelet morphology is normal.     Automated Count Confirmed. Platelet morphology is normal.    Reactive Lymphocytes Present (A) None Seen       Medications   0.9% sodium chloride BOLUS (0 mLs Intravenous Stopped 4/8/23 1810)     Followed by   sodium chloride 0.9% infusion (has no administration in time  range)   ondansetron (ZOFRAN) injection 4 mg (4 mg Intravenous $Given 4/8/23 1639)   HYDROmorphone (DILAUDID) injection 0.2 mg (has no administration in time range)   valACYclovir (VALTREX) tablet 1,000 mg (has no administration in time range)   HYDROmorphone (PF) (DILAUDID) injection 0.5 mg (0.5 mg Intravenous $Given 4/8/23 1641)   valACYclovir (VALTREX) tablet 1,000 mg (1,000 mg Oral $Given 4/8/23 1818)       Assessments & Plan (with Medical Decision Making)  68-year-old female who presents with an acute rash and severe abdominal pain with dehydration and vomiting.  Rash consistent with acute herpes zoster.  Initiated on antiviral treatment.  Treated with IV fluids and Zofran for the nausea vomiting.  No significant intra-abdominal pain.  The rash fortunately does not appear to have a secondary infection.  We will continue with antivirals for 1 week with Valtrex.  Also prescribed for pain management Norco.  Have encouraged follow-up in the clinic within 1 week.  Return anytime sooner if condition worsens or other concerns     I have reviewed the nursing notes.    I have reviewed the findings, diagnosis, plan and need for follow up with the patient.          New Prescriptions    HYDROCODONE-ACETAMINOPHEN (NORCO) 5-325 MG TABLET    Take 1 tablet by mouth every 6 hours as needed for severe pain    HYDROCODONE-ACETAMINOPHEN (NORCO) 5-325 MG TABLET    Take 1-2 tablets by mouth every 6 hours as needed for severe pain    ONDANSETRON (ZOFRAN ODT) 4 MG ODT TAB    Take 1 tablet (4 mg) by mouth every 8 hours as needed for nausea    VALACYCLOVIR (VALTREX) 1000 MG TABLET    Take 1 tablet (1,000 mg) by mouth 3 times daily    VALACYCLOVIR (VALTREX) 1000 MG TABLET    Take 1 tablet (1,000 mg) by mouth 3 times daily for 7 days       Final diagnoses:   Herpes zoster without complication       4/8/2023   [facility] EMERGENCY DEPT     Bib Barnes MD  04/08/23 3069     altered GI fx

## 2023-05-17 NOTE — PATIENT PROFILE ADULT - NSFALLSECTIONLABEL_GEN_A_CORE
From: Charles Zuluaga  To: Tracy Hughes  Sent: 5/17/2023 9:56 AM EDT  Subject: My medication refills. Luann Baker. [facility] is saying [facility] don't have refills for my meds. I just had a virtual at the end of April with you. Maybe they didn't go thru? Including mt refills on my pain med.  Thank you .

## 2024-08-02 NOTE — ED PROVIDER NOTE - CONDUCTED A DETAILED DISCUSSION WITH PATIENT AND/OR GUARDIAN REGARDING, MDM
Spoke to pt. I informed pt that she will need to establish care with a new provider but I am able to schedule her to schedule Moon. Pt asked if her visit could be virtual because she is in [place] on an assignment and unsure when she will be back in [place]. Pt stated she needs her Wegovy refilled.   lab results/need for outpatient follow-up/radiology results

## 2024-11-11 NOTE — ED PROVIDER NOTE - IV ALTEPLASE DOOR HIDDEN
Anesthesia Post Evaluation    Patient: James Jeff    Procedure(s) Performed: Procedure(s) (LRB):  SEPTOPLASTY, NOSE, WITH NASAL TURBINATE REDUCTION (Bilateral)    Final Anesthesia Type: general      Patient location during evaluation: PACU  Patient participation: Yes- Able to Participate  Level of consciousness: awake and alert, oriented and awake  Post-procedure vital signs: reviewed and stable  Pain management: adequate  Airway patency: patent  MYRIAM mitigation strategies: Multimodal analgesia  PONV status at discharge: No PONV  Anesthetic complications: no      Cardiovascular status: blood pressure returned to baseline and hemodynamically stable  Respiratory status: unassisted and spontaneous ventilation  Hydration status: euvolemic  Follow-up not needed.              Vitals Value Taken Time   /85 11/11/24 1102   Temp 36.8 °C (98.2 °F) 11/11/24 0850   Pulse 70 11/11/24 1106   Resp 12 11/11/24 1105   SpO2 97 % 11/11/24 1106   Vitals shown include unfiled device data.      Event Time   Out of Recovery 10:00:00         Pain/Hank Score: Pain Rating Prior to Med Admin: 5 (11/11/2024 10:21 AM)  Pain Rating Post Med Admin: 3 (11/11/2024 11:00 AM)  Ahnk Score: 10 (11/11/2024 11:00 AM)           show

## 2025-02-24 NOTE — H&P ADULT - CONSTITUTIONAL
Occupational Therapy    Patient not seen in therapy.     Unavailable due to eating meal.      Re-attempt plan: later today or tomorrow    Patient is eating breakfast, OT will follow up as schedule permits.      OBJECTIVE                           Therapy procedure time and total treatment time can be found documented on the Time Entry flowsheet   detailed exam

## 2025-02-25 ENCOUNTER — INPATIENT (INPATIENT)
Facility: HOSPITAL | Age: 50
LOS: 0 days | Discharge: HOME | DRG: 812 | End: 2025-02-26
Attending: INTERNAL MEDICINE | Admitting: INTERNAL MEDICINE
Payer: COMMERCIAL

## 2025-02-25 VITALS
HEART RATE: 96 BPM | RESPIRATION RATE: 16 BRPM | SYSTOLIC BLOOD PRESSURE: 153 MMHG | TEMPERATURE: 98 F | DIASTOLIC BLOOD PRESSURE: 93 MMHG | WEIGHT: 175.93 LBS | HEIGHT: 65 IN | OXYGEN SATURATION: 97 %

## 2025-02-25 DIAGNOSIS — D64.9 ANEMIA, UNSPECIFIED: ICD-10-CM

## 2025-02-25 DIAGNOSIS — Z29.9 ENCOUNTER FOR PROPHYLACTIC MEASURES, UNSPECIFIED: ICD-10-CM

## 2025-02-25 DIAGNOSIS — Z90.49 ACQUIRED ABSENCE OF OTHER SPECIFIED PARTS OF DIGESTIVE TRACT: Chronic | ICD-10-CM

## 2025-02-25 DIAGNOSIS — Z98.89 OTHER SPECIFIED POSTPROCEDURAL STATES: Chronic | ICD-10-CM

## 2025-02-25 DIAGNOSIS — K21.9 GASTRO-ESOPHAGEAL REFLUX DISEASE WITHOUT ESOPHAGITIS: ICD-10-CM

## 2025-02-25 DIAGNOSIS — Z98.84 BARIATRIC SURGERY STATUS: ICD-10-CM

## 2025-02-25 DIAGNOSIS — Z98.84 BARIATRIC SURGERY STATUS: Chronic | ICD-10-CM

## 2025-02-25 DIAGNOSIS — R69 ILLNESS, UNSPECIFIED: Chronic | ICD-10-CM

## 2025-02-25 LAB
ALBUMIN SERPL ELPH-MCNC: 4 G/DL — SIGNIFICANT CHANGE UP (ref 3.5–5)
ALP SERPL-CCNC: 133 U/L — HIGH (ref 40–120)
ALT FLD-CCNC: 36 U/L DA — SIGNIFICANT CHANGE UP (ref 10–60)
ANION GAP SERPL CALC-SCNC: 9 MMOL/L — SIGNIFICANT CHANGE UP (ref 5–17)
APTT BLD: 26.3 SEC — SIGNIFICANT CHANGE UP (ref 24.5–35.6)
AST SERPL-CCNC: 31 U/L — SIGNIFICANT CHANGE UP (ref 10–40)
BASOPHILS # BLD AUTO: 0.12 K/UL — SIGNIFICANT CHANGE UP (ref 0–0.2)
BASOPHILS NFR BLD AUTO: 1.8 % — SIGNIFICANT CHANGE UP (ref 0–2)
BILIRUB SERPL-MCNC: 1.1 MG/DL — SIGNIFICANT CHANGE UP (ref 0.2–1.2)
BLD GP AB SCN SERPL QL: SIGNIFICANT CHANGE UP
BUN SERPL-MCNC: 13 MG/DL — SIGNIFICANT CHANGE UP (ref 7–18)
CALCIUM SERPL-MCNC: 9.1 MG/DL — SIGNIFICANT CHANGE UP (ref 8.4–10.5)
CHLORIDE SERPL-SCNC: 106 MMOL/L — SIGNIFICANT CHANGE UP (ref 96–108)
CO2 SERPL-SCNC: 26 MMOL/L — SIGNIFICANT CHANGE UP (ref 22–31)
CREAT SERPL-MCNC: 0.5 MG/DL — SIGNIFICANT CHANGE UP (ref 0.5–1.3)
EGFR: 116 ML/MIN/1.73M2 — SIGNIFICANT CHANGE UP
EOSINOPHIL # BLD AUTO: 0.07 K/UL — SIGNIFICANT CHANGE UP (ref 0–0.5)
EOSINOPHIL NFR BLD AUTO: 1.1 % — SIGNIFICANT CHANGE UP (ref 0–6)
FERRITIN SERPL-MCNC: 9 NG/ML — LOW (ref 15–150)
GLUCOSE SERPL-MCNC: 103 MG/DL — HIGH (ref 70–99)
HCG SERPL-ACNC: 1 MIU/ML — SIGNIFICANT CHANGE UP
HCT VFR BLD CALC: 26.2 % — LOW (ref 34.5–45)
HCT VFR BLD CALC: 26.3 % — LOW (ref 34.5–45)
HGB BLD-MCNC: 7.3 G/DL — LOW (ref 11.5–15.5)
HGB BLD-MCNC: 7.8 G/DL — LOW (ref 11.5–15.5)
IMM GRANULOCYTES NFR BLD AUTO: 0.2 % — SIGNIFICANT CHANGE UP (ref 0–0.9)
INR BLD: 0.94 RATIO — SIGNIFICANT CHANGE UP (ref 0.85–1.16)
IRON SATN MFR SERPL: 105 UG/DL — SIGNIFICANT CHANGE UP (ref 40–150)
IRON SATN MFR SERPL: 19 % — SIGNIFICANT CHANGE UP (ref 15–50)
LACTATE SERPL-SCNC: 0.8 MMOL/L — SIGNIFICANT CHANGE UP (ref 0.7–2)
LIDOCAIN IGE QN: 72 U/L — SIGNIFICANT CHANGE UP (ref 13–75)
LYMPHOCYTES # BLD AUTO: 1.92 K/UL — SIGNIFICANT CHANGE UP (ref 1–3.3)
LYMPHOCYTES # BLD AUTO: 29.4 % — SIGNIFICANT CHANGE UP (ref 13–44)
MCHC RBC-ENTMCNC: 19.4 PG — LOW (ref 27–34)
MCHC RBC-ENTMCNC: 21.1 PG — LOW (ref 27–34)
MCHC RBC-ENTMCNC: 27.9 G/DL — LOW (ref 32–36)
MCHC RBC-ENTMCNC: 29.7 G/DL — LOW (ref 32–36)
MCV RBC AUTO: 69.7 FL — LOW (ref 80–100)
MCV RBC AUTO: 71.1 FL — LOW (ref 80–100)
MONOCYTES # BLD AUTO: 0.49 K/UL — SIGNIFICANT CHANGE UP (ref 0–0.9)
MONOCYTES NFR BLD AUTO: 7.5 % — SIGNIFICANT CHANGE UP (ref 2–14)
NEUTROPHILS # BLD AUTO: 3.92 K/UL — SIGNIFICANT CHANGE UP (ref 1.8–7.4)
NEUTROPHILS NFR BLD AUTO: 60 % — SIGNIFICANT CHANGE UP (ref 43–77)
NRBC BLD AUTO-RTO: 0 /100 WBCS — SIGNIFICANT CHANGE UP (ref 0–0)
NRBC BLD AUTO-RTO: 0 /100 WBCS — SIGNIFICANT CHANGE UP (ref 0–0)
PLATELET # BLD AUTO: 432 K/UL — HIGH (ref 150–400)
PLATELET # BLD AUTO: 515 K/UL — HIGH (ref 150–400)
POTASSIUM SERPL-MCNC: 3.8 MMOL/L — SIGNIFICANT CHANGE UP (ref 3.5–5.3)
POTASSIUM SERPL-SCNC: 3.8 MMOL/L — SIGNIFICANT CHANGE UP (ref 3.5–5.3)
PROT SERPL-MCNC: 8.2 G/DL — SIGNIFICANT CHANGE UP (ref 6–8.3)
PROTHROM AB SERPL-ACNC: 10.9 SEC — SIGNIFICANT CHANGE UP (ref 9.9–13.4)
RBC # BLD: 3.7 M/UL — LOW (ref 3.8–5.2)
RBC # BLD: 3.76 M/UL — LOW (ref 3.8–5.2)
RBC # FLD: 19.9 % — HIGH (ref 10.3–14.5)
RBC # FLD: 20.4 % — HIGH (ref 10.3–14.5)
SODIUM SERPL-SCNC: 141 MMOL/L — SIGNIFICANT CHANGE UP (ref 135–145)
TIBC SERPL-MCNC: 548 UG/DL — HIGH (ref 250–450)
TROPONIN I, HIGH SENSITIVITY RESULT: 9 NG/L — SIGNIFICANT CHANGE UP
UIBC SERPL-MCNC: 443 UG/DL — HIGH (ref 110–370)
WBC # BLD: 6.29 K/UL — SIGNIFICANT CHANGE UP (ref 3.8–10.5)
WBC # BLD: 6.53 K/UL — SIGNIFICANT CHANGE UP (ref 3.8–10.5)
WBC # FLD AUTO: 6.29 K/UL — SIGNIFICANT CHANGE UP (ref 3.8–10.5)
WBC # FLD AUTO: 6.53 K/UL — SIGNIFICANT CHANGE UP (ref 3.8–10.5)

## 2025-02-25 PROCEDURE — 74178 CT ABD&PLV WO CNTR FLWD CNTR: CPT | Mod: 26

## 2025-02-25 PROCEDURE — 71045 X-RAY EXAM CHEST 1 VIEW: CPT | Mod: 26

## 2025-02-25 PROCEDURE — 99291 CRITICAL CARE FIRST HOUR: CPT

## 2025-02-25 RX ORDER — ACETAMINOPHEN 500 MG/5ML
650 LIQUID (ML) ORAL EVERY 6 HOURS
Refills: 0 | Status: DISCONTINUED | OUTPATIENT
Start: 2025-02-25 | End: 2025-02-26

## 2025-02-25 RX ORDER — MELATONIN 5 MG
3 TABLET ORAL AT BEDTIME
Refills: 0 | Status: DISCONTINUED | OUTPATIENT
Start: 2025-02-25 | End: 2025-02-26

## 2025-02-25 RX ORDER — MECLIZINE HCL 12.5 MG
25 TABLET ORAL ONCE
Refills: 0 | Status: COMPLETED | OUTPATIENT
Start: 2025-02-25 | End: 2025-02-25

## 2025-02-25 RX ORDER — ONDANSETRON HCL/PF 4 MG/2 ML
4 VIAL (ML) INJECTION EVERY 8 HOURS
Refills: 0 | Status: DISCONTINUED | OUTPATIENT
Start: 2025-02-25 | End: 2025-02-26

## 2025-02-25 RX ORDER — MAGNESIUM, ALUMINUM HYDROXIDE 200-200 MG
30 TABLET,CHEWABLE ORAL EVERY 4 HOURS
Refills: 0 | Status: DISCONTINUED | OUTPATIENT
Start: 2025-02-25 | End: 2025-02-26

## 2025-02-25 RX ORDER — METOCLOPRAMIDE HCL 10 MG
10 TABLET ORAL ONCE
Refills: 0 | Status: COMPLETED | OUTPATIENT
Start: 2025-02-25 | End: 2025-02-25

## 2025-02-25 RX ADMIN — Medication 1000 MILLILITER(S): at 13:34

## 2025-02-25 RX ADMIN — Medication 10 MILLIGRAM(S): at 13:34

## 2025-02-25 RX ADMIN — Medication 25 MILLIGRAM(S): at 13:35

## 2025-02-25 NOTE — H&P ADULT - PROBLEM SELECTOR PLAN 2
Hx of GERD as per chart review Hx of GERD as per chart review  EGD, Cx in 2022 was WNL       c/w PPI

## 2025-02-25 NOTE — ED ADULT NURSE NOTE - OBJECTIVE STATEMENT
pt is here for abnormal lab result.  pt stated that dizziness x 3 days, sending by MD Hgb 7.3, denied chest pain or shortness of breath,

## 2025-02-25 NOTE — H&P ADULT - NSHPPHYSICALEXAM_GEN_ALL_CORE
PHYSICAL EXAM:  GENERAL: NAD, speaks in full sentences, no signs of respiratory distress  HEAD:  Atraumatic, Normocephalic  EYES: EOMI, PERRLA, + pallor   NECK: Supple, No JVD  CHEST/LUNG: Clear to auscultation bilaterally; No wheeze; No crackles; No accessory muscles used  HEART: Regular rate and rhythm; No murmurs;   ABDOMEN: Soft, Nontender, Nondistended; Bowel sounds present; No guarding  EXTREMITIES:  2+ Peripheral Pulses, No cyanosis or edema  PSYCH: AAOx3  NEUROLOGY: non-focal  SKIN: No rashes or lesions

## 2025-02-25 NOTE — ED PROVIDER NOTE - PROGRESS NOTE DETAILS
Hemoglobin 7.3.  PRBC ordered. CT without any acute evidence of bleeding.  Vital signs stable.  Will admit to medicine for symptomatic anemia.  Vital signs stable rest comfortably. Connor Soto MD.

## 2025-02-25 NOTE — H&P ADULT - PROBLEM SELECTOR PLAN 1
-NPO  -GI  _____, appreciated  -PPI BID  -maintain active T&S, 2 large bore peripheral IVs, transfuse for goal Hb >7 or if symptomatic  -trend CBC q8h SOB, palpitations x 2 months   Hb 7.3  s/p 1 U PRBC in ER   -NPO  -PPI BID  -maintain active T&S, 2 large bore peripheral IVs, transfuse for goal Hb >7 or if symptomatic  -trend CBC q8h  - f/u anemia w/u   [ ] post Tx CBC to be ordered  [ ] GI to be consulted in AM

## 2025-02-25 NOTE — CHART NOTE - NSCHARTNOTEFT_GEN_A_CORE
EVENT: Hemoglobin: 7.3 g/dL (02.25.25 @ 13:20) s/p 1 PRBC. Pt c/o hunger denies abd pain nausea or vomiting    BRIEF HPI:        OBJECTIVE:  Vital Signs Last 24 Hrs  T(C): 36.7 (25 Feb 2025 19:59), Max: 37.1 (25 Feb 2025 19:00)  T(F): 98 (25 Feb 2025 19:59), Max: 98.7 (25 Feb 2025 19:00)  HR: 77 (25 Feb 2025 19:59) (74 - 97)  BP: 147/80 (25 Feb 2025 19:59) (109/66 - 156/100)  BP(mean): --  RR: 18 (25 Feb 2025 19:59) (16 - 18)  SpO2: 97% (25 Feb 2025 19:59) (96% - 98%)    Parameters below as of 25 Feb 2025 19:59  Patient On (Oxygen Delivery Method): room air        FOCUSED PHYSICAL EXAM:    LABS:                        7.3    6.53  )-----------( 515      ( 25 Feb 2025 13:20 )             26.2     02-25    141  |  106  |  13  ----------------------------<  103[H]  3.8   |  26  |  0.50    Ca    9.1      25 Feb 2025 13:20    TPro  8.2  /  Alb  4.0  /  TBili  1.1  /  DBili  x   /  AST  31  /  ALT  36  /  AlkPhos  133[H]  02-25      EKG:   IMGAGING:    ASSESSMENT:  HPI:  Objective Statement: Patient is a 49-year-old female with no significant past medical history, past surgical history of Shahid-en-Y gastric bypass in 2014, ex lap, drainage of intra-abdominal abscess with removal of mesh ring and gastric pouch in 2014, incisional hernia repair in 2015, abdominal wall mass excision 2016 Presenting with 1 week of lightheadedness and dizziness, 3 months of exertional dyspnea, low hemoglobin outpatient.  Patient states she has had approximately 3 months of exertional dyspnea.  Over the past week patient has had weakness, lightheadedness, dizziness with room spinning.  Patient went to urgent care and was told her hemoglobin 7.3.  Patient last blood test was 1 year ago and hemoglobin was within normal limits at the time.  Denies any abdominal pain, back pain, chest pain,  nausea vomiting, dysuria, hematuria, melena, hematochezia, hematemesis, cough, congestion.  Vital signs stable, no focal neurologic deficits, moving extremities equally.      In the ER     VS - /73, RR17, afebrile, sats 96% RA, HR 86    Labs s/o Hb 7.3 ( microcytic) , Plt 575    CTAP  No bowel obstruction or grossly thickened bowel wall. Appendix within   normal limits. Stable postsurgical changes of gastric bypass. No evidence   for active contrast extravasation in the bowel lumen to suggest an active   GI bleed.    CXR neg     s/p meclizine, reglan 1 L NS bolus     receiving 1 U PRBC     Admitted to symptomatic anemia and GIB r/o  (25 Feb 2025 18:02)      PLAN:     Diet, Consistent Carbohydrate Full Liquid (02-25-25 @ 21:57) [Active]  Diet, NPO after Midnight:    NPO Start Date: 25-Feb-2025,   NPO Start Time: 23:59 (02-25-25 @ 21:57) [Active]      FOLLOW UP / RESULT: EVENT: Hemoglobin: 7.3 g/dL (02.25.25 @ 13:20) s/p 1 PRBC. Pt c/o hunger denies abd pain nausea or vomiting. Post transfusion Hemoglobin: 7.8 g/dL (02.25.25 @ 22:18)    BRIEF HPI: 49-year-old female with no significant past medical history, past surgical history of Shahid-en-Y gastric bypass in 2014, ex lap, drainage of intra-abdominal abscess with removal of mesh ring and gastric pouch in 2014, incisional hernia repair in 2015, abdominal wall mass excision 2016 Presenting with 1 week of lightheadedness and dizziness, 3 months of exertional dyspnea, low hemoglobin outpatient.  S/p 1 U PRBC in ER, VSS, CTAP neg acute bleed.     OBJECTIVE:  Vital Signs Last 24 Hrs  T(C): 36.7 (25 Feb 2025 19:59), Max: 37.1 (25 Feb 2025 19:00)  T(F): 98 (25 Feb 2025 19:59), Max: 98.7 (25 Feb 2025 19:00)  HR: 77 (25 Feb 2025 19:59) (74 - 97)  BP: 147/80 (25 Feb 2025 19:59) (109/66 - 156/100)  BP(mean): --  RR: 18 (25 Feb 2025 19:59) (16 - 18)  SpO2: 97% (25 Feb 2025 19:59) (96% - 98%)    Parameters below as of 25 Feb 2025 19:59  Patient On (Oxygen Delivery Method): room air    FOCUSED PHYSICAL EXAM:  GEN: Pale adult female in bed, no acute distress or evidence of bleed  RESP: Even, unlabored on room air  CV: S1 S2 regular  NEURO: Alert, oriented X 4    LABS:                        7.3    6.53  )-----------( 515      ( 25 Feb 2025 13:20 )             26.2     02-25    141  |  106  |  13  ----------------------------<  103[H]  3.8   |  26  |  0.50    Ca    9.1      25 Feb 2025 13:20    TPro  8.2  /  Alb  4.0  /  TBili  1.1  /  DBili  x   /  AST  31  /  ALT  36  /  Alk Phos  133[H]  02-25    IMAGING:  ACC: [id number] EXAM:  CT ABDOMEN AND PELVIS    PROCEDURE DATE:  02/25/2025    IMPRESSION:  No bowel obstruction or grossly thickened bowel wall. Appendix within normal limits. Stable postsurgical changes of gastric bypass. No evidence for active contrast extravasation in the bowel lumen to suggest an active GI bleed.    PROBLEM: Anaemia probably due to Shahid-en-Y gastric bypass  PLAN:   Diet, Consistent Carbohydrate Full Liquid (02-25-25 @ 21:57) [Active]  Diet, NPO after Midnight:    NPO Start Date: 25-Feb-2025,   NPO Start Time: 23:59 (02-25-25 @ 21:57) [Active]    FOLLOW UP / RESULT: Anemia w/u , GI consult, AM labs EVENT: Hemoglobin: 7.3 g/dL (02.25.25 @ 13:20) s/p 1 PRBC. Pt c/o hunger denies abd pain nausea or vomiting. Post transfusion Hemoglobin: 7.8 g/dL (02.25.25 @ 22:18)    BRIEF HPI: 49-year-old female with no significant past medical history, past surgical history of Shahid-en-Y gastric bypass in 2014, ex lap, drainage of intra-abdominal abscess with removal of mesh ring and gastric pouch in 2014, incisional hernia repair in 2015, abdominal wall mass excision 2016 Presenting with 1 week of lightheadedness and dizziness, 3 months of exertional dyspnea, low hemoglobin outpatient.  S/p 1 U PRBC in ER, VSS, CTAP neg acute bleed.     OBJECTIVE:  Vital Signs Last 24 Hrs  T(C): 36.7 (25 Feb 2025 19:59), Max: 37.1 (25 Feb 2025 19:00)  T(F): 98 (25 Feb 2025 19:59), Max: 98.7 (25 Feb 2025 19:00)  HR: 77 (25 Feb 2025 19:59) (74 - 97)  BP: 147/80 (25 Feb 2025 19:59) (109/66 - 156/100)  BP(mean): --  RR: 18 (25 Feb 2025 19:59) (16 - 18)  SpO2: 97% (25 Feb 2025 19:59) (96% - 98%)    Parameters below as of 25 Feb 2025 19:59  Patient On (Oxygen Delivery Method): room air    FOCUSED PHYSICAL EXAM:  GEN: Pale adult female in bed, no acute distress or evidence of bleed  RESP: Even, unlabored on room air  CV: S1 S2 regular  NEURO: Alert, oriented X 4    LABS:                        7.3    6.53  )-----------( 515      ( 25 Feb 2025 13:20 )             26.2     02-25    141  |  106  |  13  ----------------------------<  103[H]  3.8   |  26  |  0.50    Ca    9.1      25 Feb 2025 13:20    TPro  8.2  /  Alb  4.0  /  TBili  1.1  /  DBili  x   /  AST  31  /  ALT  36  /  Alk Phos  133[H]  02-25    IMAGING:  ACC: [id number] EXAM:  CT ABDOMEN AND PELVIS    PROCEDURE DATE:  02/25/2025    IMPRESSION:  No bowel obstruction or grossly thickened bowel wall. Appendix within normal limits. Stable postsurgical changes of gastric bypass. No evidence for active contrast extravasation in the bowel lumen to suggest an active GI bleed.    PROBLEM: Anaemia probably due to Shahid-en-Y gastric bypass  PLAN:   Diet, Consistent Carbohydrate Full Liquid (02-25-25 @ 21:57) [Active]  Diet, NPO after Midnight:    NPO Start Date: 25-Feb-2025,   NPO Start Time: 23:59 (02-25-25 @ 21:57) [Active] pending possible GI intervention    FOLLOW UP / RESULT: Anemia w/u , GI consult, AM labs

## 2025-02-25 NOTE — PATIENT PROFILE ADULT - FALL HARM RISK - HARM RISK INTERVENTIONS
Assistance with ambulation/Assistance OOB with selected safe patient handling equipment/Communicate Risk of Fall with Harm to all staff/Discuss with provider need for PT consult/Monitor gait and stability/Provide patient with walking aids - walker, cane, crutches/Reinforce activity limits and safety measures with patient and family/Sit up slowly, dangle for a short time, stand at bedside before walking/Tailored Fall Risk Interventions/Visual Cue: Yellow wristband and red socks/Bed in lowest position, wheels locked, appropriate side rails in place/Call bell, personal items and telephone in reach/Instruct patient to call for assistance before getting out of bed or chair/Non-slip footwear when patient is out of bed/[place] to call system/Physically safe environment - no spills, clutter or unnecessary equipment/Purposeful Proactive Rounding/Room/bathroom lighting operational, light cord in reach

## 2025-02-25 NOTE — ED ADULT NURSE NOTE - CHIEF COMPLAINT
PHarmacy called for dose adjusment for Meloxican 7.5 mg. The patient is a 49y Female complaining of abnormal lab result.

## 2025-02-25 NOTE — H&P ADULT - HISTORY OF PRESENT ILLNESS
Objective Statement: Patient is a 49-year-old female with no significant past medical history, past surgical history of Shahid-en-Y gastric bypass in 2014, ex lap, drainage of intra-abdominal abscess with removal of mesh ring and gastric pouch in 2014, incisional hernia repair in 2015, abdominal wall mass excision 2016 Presenting with 1 week of lightheadedness and dizziness, 3 months of exertional dyspnea, low hemoglobin outpatient.  Patient states she has had approximately 3 months of exertional dyspnea.  Over the past week patient has had weakness, lightheadedness, dizziness with room spinning.  Patient went to urgent care and was told her hemoglobin 7.3.  Patient last blood test was 1 year ago and hemoglobin was within normal limits at the time.  Denies any abdominal pain, back pain, chest pain,  nausea vomiting, dysuria, hematuria, melena, hematochezia, hematemesis, cough, congestion.  Vital signs stable, no focal neurologic deficits, moving extremities equally.      In the ER     VS - /73, RR17, afebrile, sats 96% RA, HR 86    Labs s/o Hb 7.3 ( microcytic) , Plt 575    CTAP  No bowel obstruction or grossly thickened bowel wall. Appendix within   normal limits. Stable postsurgical changes of gastric bypass. No evidence   for active contrast extravasation in the bowel lumen to suggest an active   GI bleed.    CXR neg     s/p meclizine, reglan 1 L NS bolus     receiving 1 U PRBC     Admitted to symptomatic anemia and GIB r/o

## 2025-02-25 NOTE — H&P ADULT - NSHPREVIEWOFSYSTEMS_GEN_ALL_CORE
- CONSTITUTIONAL: Denies fever and chills  - HEENT: Denies changes in vision and hearing.  - RESPIRATORY: + SOB and  denies cough.  - CV: Denies chest pain and + palpitations  - GI: Denies abdominal pain, nausea, vomiting and diarrhea.  - : Denies dysuria and urinary frequency.  - SKIN: Denies rash and pruritus.  - NEUROLOGICAL: Denies headache and syncope.  - PSYCHIATRIC: Denies recent changes in mood. Denies anxiety and depression.

## 2025-02-25 NOTE — ED PROVIDER NOTE - CLINICAL SUMMARY MEDICAL DECISION MAKING FREE TEXT BOX
Patient is a 49-year-old female with no significant past medical history, past surgical history of Shahid-en-Y gastric bypass in 2014, ex lap, drainage of intra-abdominal abscess with removal of mesh ring and gastric pouch in 2014, incisional hernia repair in 2015, abdominal wall mass excision 2016 Presenting with 1 week of lightheadedness and dizziness, 3 months of exertional dyspnea, low hemoglobin outpatient.  Patient states she has had approximately 3 months of exertional dyspnea.  Over the past week patient has had weakness, lightheadedness, dizziness with room spinning.  Patient went to urgent care and was told her hemoglobin 7.3.  Signs stable, belly soft no peritoneal signs, no focal neurologic deficits.  – Will check labs, rule out electrolyte abnormalities and blood loss anemia, lactate to eval for end organ dysfunction, EKG troponin to r.o acs vs arrhythmia, CT head to rule out bleed, versus mass, CT angio A/P to rule out ulcer versus diverticular bleed versus active bleed, hydrate, treat possible peripheral vertigo, likely admit for symptomatic anemia.

## 2025-02-25 NOTE — H&P ADULT - PROBLEM SELECTOR PLAN 3
Hx of bypass   Stopped taking her vitamins Hx of bypass   Stopped taking her vitamins due to weight gain

## 2025-02-25 NOTE — ED PROVIDER NOTE - PHYSICAL EXAMINATION
Gen: no acute distress  Head: normocephalic, atraumatic  Lung: CTAB, no respiratory distress, no wheezing, rales, rhonchi  CV: normal s1/s2, rrr,   Abd: soft, Mildly tender in the epigastrium, no rebound guarding or peritoneal signs  MSK:  full range of motion in all 4 extremities  Neuro: No focal neurologic deficits

## 2025-02-25 NOTE — ED PROVIDER NOTE - CRITICAL CARE ATTENDING CONTRIBUTION TO CARE
Blood transfusion, multiple reassessments, admission for symptomatic anemia Bactrim Counseling:  I discussed with the patient the risks of sulfa antibiotics including but not limited to GI upset, allergic reaction, drug rash, diarrhea, dizziness, photosensitivity, and yeast infections.  Rarely, more serious reactions can occur including but not limited to aplastic anemia, agranulocytosis, methemoglobinemia, blood dyscrasias, liver or kidney failure, lung infiltrates or desquamative/blistering drug rashes.

## 2025-02-25 NOTE — ED PROVIDER NOTE - OBJECTIVE STATEMENT
Patient is a 49-year-old female with no significant past medical history, past surgical history of Shahid-en-Y gastric bypass in 2014, ex lap, drainage of intra-abdominal abscess with removal of mesh ring and gastric pouch in 2014, incisional hernia repair in 2015, abdominal wall mass excision 2016 Presenting with 1 week of lightheadedness and dizziness, 3 months of exertional dyspnea, low hemoglobin outpatient.  Patient states she has had approximately 3 months of exertional dyspnea.  Over the past week patient has had weakness, lightheadedness, dizziness with room spinning.  Patient went to urgent care and was told her hemoglobin 7.3.  Patient last blood test was 1 year ago and hemoglobin was within normal limits at the time.  Denies any abdominal pain, back pain, chest pain,  nausea vomiting, dysuria, hematuria, melena, hematochezia, hematemesis, cough, congestion.  Vital signs stable, no focal neurologic deficits, moving extremities equally.

## 2025-02-25 NOTE — H&P ADULT - ASSESSMENT
Objective Statement: Patient is a 49-year-old female with no significant past medical history, past surgical history of Shahid-en-Y gastric bypass in 2014, ex lap, drainage of intra-abdominal abscess with removal of mesh ring and gastric pouch in 2014, incisional hernia repair in 2015, abdominal wall mass excision 2016 Presenting with 1 week of lightheadedness and dizziness, 3 months of exertional dyspnea, low hemoglobin outpatient.  Patient states she has had approximately 3 months of exertional dyspnea.  Over the past week patient has had weakness, lightheadedness, dizziness with room spinning.  Patient went to urgent care and was told her hemoglobin 7.3.  Patient last blood test was 1 year ago and hemoglobin was within normal limits at the time.  Denies any abdominal pain, back pain, chest pain,  nausea vomiting, dysuria, hematuria, melena, hematochezia, hematemesis, cough, congestion.  Vital signs stable, no focal neurologic deficits, moving extremities equally.      In the ER     VS - /73, RR17, afebrile, sats 96% RA, HR 86    Labs s/o Hb 7.3 ( microcytic) , Plt 575    CTAP  No bowel obstruction or grossly thickened bowel wall. Appendix within   normal limits. Stable postsurgical changes of gastric bypass. No evidence   for active contrast extravasation in the bowel lumen to suggest an active   GI bleed.    CXR neg     s/p meclizine, reglan 1 L NS bolus     receiving 1 U PRBC     Admitted to symptomatic anemia and GIB r/o    Objective Statement: Patient is a 49-year-old female with no significant past medical history, past surgical history of Shahid-en-Y gastric bypass in 2014, ex lap, drainage of intra-abdominal abscess with removal of mesh ring and gastric pouch in 2014, incisional hernia repair in 2015, abdominal wall mass excision 2016 Presenting with 1 week of lightheadedness and dizziness, 3 months of exertional dyspnea, low hemoglobin outpatient.  S/p 1 U PRBC in ER, VSS, CTAP neg acute bleed.       Admitted to symptomatic anemia and GIB r/o

## 2025-02-26 ENCOUNTER — TRANSCRIPTION ENCOUNTER (OUTPATIENT)
Age: 50
End: 2025-02-26

## 2025-02-26 VITALS
DIASTOLIC BLOOD PRESSURE: 86 MMHG | TEMPERATURE: 98 F | RESPIRATION RATE: 18 BRPM | SYSTOLIC BLOOD PRESSURE: 137 MMHG | HEART RATE: 89 BPM | OXYGEN SATURATION: 97 %

## 2025-02-26 DIAGNOSIS — Z75.8 OTHER PROBLEMS RELATED TO MEDICAL FACILITIES AND OTHER HEALTH CARE: ICD-10-CM

## 2025-02-26 LAB
ALBUMIN SERPL ELPH-MCNC: 3.5 G/DL — SIGNIFICANT CHANGE UP (ref 3.5–5)
ALP SERPL-CCNC: 116 U/L — SIGNIFICANT CHANGE UP (ref 40–120)
ALT FLD-CCNC: 28 U/L DA — SIGNIFICANT CHANGE UP (ref 10–60)
ANION GAP SERPL CALC-SCNC: 8 MMOL/L — SIGNIFICANT CHANGE UP (ref 5–17)
AST SERPL-CCNC: 23 U/L — SIGNIFICANT CHANGE UP (ref 10–40)
BASOPHILS # BLD AUTO: 0.11 K/UL — SIGNIFICANT CHANGE UP (ref 0–0.2)
BASOPHILS NFR BLD AUTO: 2.1 % — HIGH (ref 0–2)
BILIRUB SERPL-MCNC: 1.2 MG/DL — SIGNIFICANT CHANGE UP (ref 0.2–1.2)
BUN SERPL-MCNC: 9 MG/DL — SIGNIFICANT CHANGE UP (ref 7–18)
CALCIUM SERPL-MCNC: 9 MG/DL — SIGNIFICANT CHANGE UP (ref 8.4–10.5)
CHLORIDE SERPL-SCNC: 108 MMOL/L — SIGNIFICANT CHANGE UP (ref 96–108)
CO2 SERPL-SCNC: 26 MMOL/L — SIGNIFICANT CHANGE UP (ref 22–31)
CREAT SERPL-MCNC: 0.45 MG/DL — LOW (ref 0.5–1.3)
EGFR: 119 ML/MIN/1.73M2 — SIGNIFICANT CHANGE UP
EOSINOPHIL # BLD AUTO: 0.07 K/UL — SIGNIFICANT CHANGE UP (ref 0–0.5)
EOSINOPHIL NFR BLD AUTO: 1.3 % — SIGNIFICANT CHANGE UP (ref 0–6)
FOLATE SERPL-MCNC: 16.7 NG/ML — SIGNIFICANT CHANGE UP
GLUCOSE BLDC GLUCOMTR-MCNC: 105 MG/DL — HIGH (ref 70–99)
GLUCOSE SERPL-MCNC: 98 MG/DL — SIGNIFICANT CHANGE UP (ref 70–99)
HCT VFR BLD CALC: 26.2 % — LOW (ref 34.5–45)
HGB BLD-MCNC: 7.8 G/DL — LOW (ref 11.5–15.5)
IMM GRANULOCYTES NFR BLD AUTO: 0.2 % — SIGNIFICANT CHANGE UP (ref 0–0.9)
LYMPHOCYTES # BLD AUTO: 2.25 K/UL — SIGNIFICANT CHANGE UP (ref 1–3.3)
LYMPHOCYTES # BLD AUTO: 43.1 % — SIGNIFICANT CHANGE UP (ref 13–44)
MAGNESIUM SERPL-MCNC: 2 MG/DL — SIGNIFICANT CHANGE UP (ref 1.6–2.6)
MCHC RBC-ENTMCNC: 21 PG — LOW (ref 27–34)
MCHC RBC-ENTMCNC: 29.8 G/DL — LOW (ref 32–36)
MCV RBC AUTO: 70.4 FL — LOW (ref 80–100)
MONOCYTES # BLD AUTO: 0.52 K/UL — SIGNIFICANT CHANGE UP (ref 0–0.9)
MONOCYTES NFR BLD AUTO: 10 % — SIGNIFICANT CHANGE UP (ref 2–14)
NEUTROPHILS # BLD AUTO: 2.26 K/UL — SIGNIFICANT CHANGE UP (ref 1.8–7.4)
NEUTROPHILS NFR BLD AUTO: 43.3 % — SIGNIFICANT CHANGE UP (ref 43–77)
NRBC BLD AUTO-RTO: 0 /100 WBCS — SIGNIFICANT CHANGE UP (ref 0–0)
PHOSPHATE SERPL-MCNC: 4.2 MG/DL — SIGNIFICANT CHANGE UP (ref 2.5–4.5)
PLATELET # BLD AUTO: 435 K/UL — HIGH (ref 150–400)
POTASSIUM SERPL-MCNC: 3.7 MMOL/L — SIGNIFICANT CHANGE UP (ref 3.5–5.3)
POTASSIUM SERPL-SCNC: 3.7 MMOL/L — SIGNIFICANT CHANGE UP (ref 3.5–5.3)
PROT SERPL-MCNC: 7.1 G/DL — SIGNIFICANT CHANGE UP (ref 6–8.3)
RBC # BLD: 3.72 M/UL — LOW (ref 3.8–5.2)
RBC # FLD: 19.7 % — HIGH (ref 10.3–14.5)
SODIUM SERPL-SCNC: 142 MMOL/L — SIGNIFICANT CHANGE UP (ref 135–145)
VIT B12 SERPL-MCNC: 711 PG/ML — SIGNIFICANT CHANGE UP (ref 232–1245)
WBC # BLD: 5.22 K/UL — SIGNIFICANT CHANGE UP (ref 3.8–10.5)
WBC # FLD AUTO: 5.22 K/UL — SIGNIFICANT CHANGE UP (ref 3.8–10.5)

## 2025-02-26 PROCEDURE — 80053 COMPREHEN METABOLIC PANEL: CPT

## 2025-02-26 PROCEDURE — 36415 COLL VENOUS BLD VENIPUNCTURE: CPT

## 2025-02-26 PROCEDURE — 85027 COMPLETE CBC AUTOMATED: CPT

## 2025-02-26 PROCEDURE — 84484 ASSAY OF TROPONIN QUANT: CPT

## 2025-02-26 PROCEDURE — 85025 COMPLETE CBC W/AUTO DIFF WBC: CPT

## 2025-02-26 PROCEDURE — 85610 PROTHROMBIN TIME: CPT

## 2025-02-26 PROCEDURE — 36430 TRANSFUSION BLD/BLD COMPNT: CPT

## 2025-02-26 PROCEDURE — 86901 BLOOD TYPING SEROLOGIC RH(D): CPT

## 2025-02-26 PROCEDURE — 96374 THER/PROPH/DIAG INJ IV PUSH: CPT

## 2025-02-26 PROCEDURE — 82607 VITAMIN B-12: CPT

## 2025-02-26 PROCEDURE — 85730 THROMBOPLASTIN TIME PARTIAL: CPT

## 2025-02-26 PROCEDURE — 82962 GLUCOSE BLOOD TEST: CPT

## 2025-02-26 PROCEDURE — 83690 ASSAY OF LIPASE: CPT

## 2025-02-26 PROCEDURE — 71045 X-RAY EXAM CHEST 1 VIEW: CPT

## 2025-02-26 PROCEDURE — 83540 ASSAY OF IRON: CPT

## 2025-02-26 PROCEDURE — 83550 IRON BINDING TEST: CPT

## 2025-02-26 PROCEDURE — 82728 ASSAY OF FERRITIN: CPT

## 2025-02-26 PROCEDURE — 83605 ASSAY OF LACTIC ACID: CPT

## 2025-02-26 PROCEDURE — P9040: CPT

## 2025-02-26 PROCEDURE — 82746 ASSAY OF FOLIC ACID SERUM: CPT

## 2025-02-26 PROCEDURE — 99285 EMERGENCY DEPT VISIT HI MDM: CPT

## 2025-02-26 PROCEDURE — 74178 CT ABD&PLV WO CNTR FLWD CNTR: CPT | Mod: MC

## 2025-02-26 PROCEDURE — 83735 ASSAY OF MAGNESIUM: CPT

## 2025-02-26 PROCEDURE — 86850 RBC ANTIBODY SCREEN: CPT

## 2025-02-26 PROCEDURE — 86900 BLOOD TYPING SEROLOGIC ABO: CPT

## 2025-02-26 PROCEDURE — 84702 CHORIONIC GONADOTROPIN TEST: CPT

## 2025-02-26 PROCEDURE — 93005 ELECTROCARDIOGRAM TRACING: CPT

## 2025-02-26 PROCEDURE — 86923 COMPATIBILITY TEST ELECTRIC: CPT

## 2025-02-26 PROCEDURE — 84100 ASSAY OF PHOSPHORUS: CPT

## 2025-02-26 RX ORDER — FOLIC ACID 1 MG/1
1 TABLET ORAL
Qty: 30 | Refills: 0
Start: 2025-02-26 | End: 2025-03-27

## 2025-02-26 RX ORDER — CYANOCOBALAMIN 1000 UG/ML
1 INJECTION INTRAMUSCULAR; SUBCUTANEOUS
Qty: 30 | Refills: 0
Start: 2025-02-26 | End: 2025-03-27

## 2025-02-26 RX ORDER — INFLUENZA A VIRUS A/IDAHO/07/2018 (H1N1) ANTIGEN (MDCK CELL DERIVED, PROPIOLACTONE INACTIVATED, INFLUENZA A VIRUS A/INDIANA/08/2018 (H3N2) ANTIGEN (MDCK CELL DERIVED, PROPIOLACTONE INACTIVATED), INFLUENZA B VIRUS B/SINGAPORE/INFTT-16-0610/2016 ANTIGEN (MDCK CELL DERIVED, PROPIOLACTONE INACTIVATED), INFLUENZA B VIRUS B/IOWA/06/2017 ANTIGEN (MDCK CELL DERIVED, PROPIOLACTONE INACTIVATED) 15; 15; 15; 15 UG/.5ML; UG/.5ML; UG/.5ML; UG/.5ML
0.5 INJECTION, SUSPENSION INTRAMUSCULAR ONCE
Refills: 0 | Status: DISCONTINUED | OUTPATIENT
Start: 2025-02-26 | End: 2025-02-26

## 2025-02-26 RX ORDER — FERROUS SULFATE 137(45) MG
1 TABLET, EXTENDED RELEASE ORAL
Qty: 30 | Refills: 0
Start: 2025-02-26 | End: 2025-03-27

## 2025-02-26 RX ORDER — KETOROLAC TROMETHAMINE 30 MG/ML
15 INJECTION, SOLUTION INTRAMUSCULAR; INTRAVENOUS ONCE
Refills: 0 | Status: DISCONTINUED | OUTPATIENT
Start: 2025-02-26 | End: 2025-02-26

## 2025-02-26 RX ADMIN — Medication 650 MILLIGRAM(S): at 00:03

## 2025-02-26 RX ADMIN — Medication 4 MILLIGRAM(S): at 10:52

## 2025-02-26 RX ADMIN — KETOROLAC TROMETHAMINE 15 MILLIGRAM(S): 30 INJECTION, SOLUTION INTRAMUSCULAR; INTRAVENOUS at 12:00

## 2025-02-26 RX ADMIN — Medication 650 MILLIGRAM(S): at 01:02

## 2025-02-26 RX ADMIN — Medication 40 MILLIGRAM(S): at 06:15

## 2025-02-26 RX ADMIN — KETOROLAC TROMETHAMINE 15 MILLIGRAM(S): 30 INJECTION, SOLUTION INTRAMUSCULAR; INTRAVENOUS at 11:40

## 2025-02-26 NOTE — DISCHARGE NOTE PROVIDER - NSDCMRMEDTOKEN_GEN_ALL_CORE_FT
acetaminophen 500 mg oral tablet: 2 tab(s) orally every 8 hours, As needed, Mild Pain (1 - 3)  ascorbic acid 500 mg oral tablet: 1 tab(s) orally once a day  ferrous sulfate 325 mg (65 mg elemental iron) oral delayed release tablet: 1 tab(s) orally once a day  folic acid 0.4 mg oral tablet: 1 tab(s) orally once a day  omeprazole 40 mg oral delayed release capsule: 1 cap(s) orally once a day   ondansetron 4 mg oral tablet, disintegratin tab(s) orally every 8 hours   traMADol 50 mg oral tablet: 1 tab(s) orally every 6 hours, As Needed -for severe pain MDD:4   Vitamin B12 1000 mcg oral tablet: 1 tab(s) orally once a day

## 2025-02-26 NOTE — PROGRESS NOTE ADULT - ASSESSMENT
· Chief Complaint: The patient is a 49y Female complaining of abnormal lab result.  · HPI Objective Statement: Patient is a 49-year-old female with no significant past medical history, past surgical history of Shahid-en-Y gastric bypass in 2014, ex lap, drainage of intra-abdominal abscess with removal of mesh ring and gastric pouch in 2014, incisional hernia repair in 2015, abdominal wall mass excision 2016 Presenting with 1 week of lightheadedness and dizziness, 3 months of exertional dyspnea, low hemoglobin outpatient.  Patient states she has had approximately 3 months of exertional dyspnea.  Over the past week patient has had weakness, lightheadedness, dizziness with room spinning.  Patient went to urgent care and was told her hemoglobin 7.3.  Patient last blood test was 1 year ago and hemoglobin was within normal limits at the time.  Denies any abdominal pain, back pain, chest pain,  nausea vomiting, dysuria, hematuria, melena, hematochezia, hematemesis, cough, congestion.  Vital signs stable, no focal neurologic deficits, moving extremities equally.    seen examined  pt has h/o obesity  s/p gastric bypass 2 years ago   and has anemia  required BT once    pt was taking Fe and B12   no uterine bleed ( no menses x 6 months)   pt follows with pcp in 1 year  she didnt have blood test over 9-10 months   pt is feeling weak  ,palpitation   no CP  pt denies  rectal bleed   has some heart burns   not on nsaid  no etoh     no rectal bleed   o/e  abd soft  epigastric tenderness mild   labs hgb 7.3  ct abd noted    a/p symptomatic anemia    BT  GI   PPI bid     
seen and examined  vssatble afebrile physical done  denies  cp   denies abd pain feeling much better  abd soft bs nml nt nd   labs noted   hgb 7.3 to 7.8    feeling better    seen by gi  initially wanted to do EGD in hosp but chenged mind advised out pt   dc home  cbc in 2 days    Fesof  ( ferritin 9) b12  folate  spinich  broccoli  chicken  out pt    GI in one week    if weakness  sob  cp  come back to ER or 911   no nsaid avoid etoh  
Objective Statement: Patient is a 49-year-old female with no significant past medical history, past surgical history of Shahid-en-Y gastric bypass in 2014, ex lap, drainage of intra-abdominal abscess with removal of mesh ring and gastric pouch in 2014, incisional hernia repair in 2015, abdominal wall mass excision 2016 Presenting with 1 week of lightheadedness and dizziness, 3 months of exertional dyspnea, low hemoglobin outpatient.  S/p 1 U PRBC in ER, VSS, CTAP neg acute bleed.   Admitted to symptomatic anemia and GIB r/o

## 2025-02-26 NOTE — DISCHARGE NOTE NURSING/CASE MANAGEMENT/SOCIAL WORK - PATIENT PORTAL LINK FT
You can access the FollowMyHealth Patient Portal offered by [facility] by registering at the following website: [email or web address]. By joining [facility]’s FollowMyHealth portal, you will also be able to view your health information using other applications (apps) compatible with our system.

## 2025-02-26 NOTE — DISCHARGE NOTE PROVIDER - CARE PROVIDERS DIRECT ADDRESSES
,DirectAddress_Unknown,[email or web address]

## 2025-02-26 NOTE — DISCHARGE NOTE NURSING/CASE MANAGEMENT/SOCIAL WORK - NSDCPEFALRISK_GEN_ALL_CORE
For information on Fall & Injury Prevention, visit: [email or web address] OR  [email or web address] OR  https://www.cdc.gov/steadi/patient.html

## 2025-02-26 NOTE — CONSULT NOTE ADULT - ASSESSMENT
49-year-old female with no significant past medical history, past surgical history of Shahid-en-Y gastric bypass in 2014, ex lap, drainage of intra-abdominal abscess with removal of mesh ring and gastric pouch in 2014, incisional hernia repair in 2015, abdominal wall mass excision 2016 Presenting with 1 week of lightheadedness and dizziness. GI consulted for symptomatic anemia    PLAN:  #Anemia  r/o pud  r/o malignancy  r/o duodenitis  r/o gastritis    -CLD  - Maintain active T&S, 2 large bore peripheral IVs, transfuse for goal Hgb >7 or if symptomatic  - Trend H/H  -s/p 1 unit PRBCs  - IV Protonix 40mg BID  - Fluid resuscitation/HD stability per primary team  - Avoid NSAIDs   -  This note and its recommendations herein are preliminary until such time as cosigned by an attending 49-year-old female with no significant past medical history, past surgical history of Shahid-en-Y gastric bypass in 2014, ex lap, drainage of intra-abdominal abscess with removal of mesh ring and gastric pouch in 2014, incisional hernia repair in 2015, abdominal wall mass excision 2016 Presenting with 1 week of lightheadedness and dizziness. GI consulted for symptomatic anemia    PLAN:  #Anemia  r/o pud  r/o malignancy  r/o duodenitis  r/o gastritis    -CLD  - Maintain active T&S, 2 large bore peripheral IVs, transfuse for goal Hgb >7 or if symptomatic  - Trend H/H, stable  -s/p 1 unit PRBCs   - IV Protonix 40mg BID  - Fluid resuscitation/HD stability per primary team  - Avoid NSAIDs   -  This note and its recommendations herein are preliminary until such time as cosigned by an attending

## 2025-02-26 NOTE — DISCHARGE NOTE NURSING/CASE MANAGEMENT/SOCIAL WORK - FINANCIAL ASSISTANCE
[facility] provides services at a reduced cost to those who are determined to be eligible through [facility]’s financial assistance program. Information regarding [facility]’s financial assistance program can be found by going to [email or web address] or by calling [phone].

## 2025-02-26 NOTE — DISCHARGE NOTE PROVIDER - CARE PROVIDER_API CALL
nell Fuentes  Phone: (   )    -  Fax: (   )    -  Follow Up Time:     Shasta Loredo  Gastroenterology  [address], [address]  [place] [address]  Phone: [phone]  Fax: [phone]  Established Patient  Follow Up Time: 1 month

## 2025-02-26 NOTE — PROGRESS NOTE ADULT - PROBLEM SELECTOR PLAN 1
SOB, palpitations x 2 months   -Anemia w/u shows high TIBC, & low ferritin  - Likely OLIVE vs absorption of intrinsic factors iso gastic bypass  -Hb 7.3  - S/p 1 U PRBC in ER   -Post transfusion Hgb 7.8  -PPI BID  -maintain active T&S, 2 large bore peripheral IVs, transfuse for goal Hb >7 or if symptomatic  - GI consulted Dr. Loredo > Recs Out/pt f/u appointment with Dr. Loredo

## 2025-02-26 NOTE — DISCHARGE NOTE PROVIDER - NSDCCPCAREPLAN_GEN_ALL_CORE_FT
PRINCIPAL DISCHARGE DIAGNOSIS  Diagnosis: Symptomatic anemia  Assessment and Plan of Treatment: You presented with lightheadedness, & dizziness. Accompanied by shortness of breath. You were found to have low blood count known as anemia. You were transfused with 1 unit of blood, with some improvement in your hemoglobin. You were also evaluated by GI team. GI is recommending no in/patient intervention at this time.   - You are to follow up with GI Dr. Loredo in out/patient clinic after discharge for further workup.   Follow up with your PCP to monitor your CBC (hemoglobin level)         SECONDARY DISCHARGE DIAGNOSES  Diagnosis: H/O gastric bypass  Assessment and Plan of Treatment: You have a history of gastric bypass surgery. This condition may likely be contributing to your low blood count. Continue to take your iron tablets, Vitamin B12, & Folic acid as prescribed.  Follow up with your PCP.

## 2025-02-26 NOTE — DISCHARGE NOTE PROVIDER - HOSPITAL COURSE
Objective Statement: Patient is a 49-year-old female with no significant past medical history, past surgical history of Shahid-en-Y gastric bypass in 2014, ex lap, drainage of intra-abdominal abscess with removal of mesh ring and gastric pouch in 2014, incisional hernia repair in 2015, abdominal wall mass excision 2016 Presenting with 1 week of lightheadedness and dizziness, 3 months of exertional dyspnea, low hemoglobin outpatient. S/p 1 U PRBC in ER, VSS, CTAP neg acute bleed.   Admitted to symptomatic anemia and GIB r/o. GI consulted, 7 recommends no in/patient intervention. Pt is advised to follow up with Dr. Loredo in out/patient for further workup.   Discussed with Dr. Castellanos, pt is optimized for discharge.

## 2025-02-26 NOTE — DISCHARGE NOTE PROVIDER - PROVIDER TOKENS
FREE:[LAST:[Alfredo],FIRST:[nell],PHONE:[(   )    -],FAX:[(   )    -]],PROVIDER:[TOKEN:[[id number]],FOLLOWUP:[1 month],ESTABLISHEDPATIENT:[T]]

## 2025-06-12 NOTE — PATIENT PROFILE ADULT - VISION (WITH CORRECTIVE LENSES IF THE PATIENT USUALLY WEARS THEM):
Partially impaired: cannot see medication labels or newsprint, but can see obstacles in path, and the surrounding layout; can count fingers at arm's length
30

## (undated) DEVICE — FORCEP RADIAL JAW 4 W NDL 2.2MM 2.8MM 240CM ORANGE DISP

## (undated) DEVICE — BLANKET WARMER UPPER ADULT

## (undated) DEVICE — KIT ENDO PROCEDURE CUST W/VLV

## (undated) DEVICE — Device

## (undated) DEVICE — BITE BLOCK SCOPE SAVER 20X27MM ADULT GREEN

## (undated) DEVICE — WRAP COMPRESSION CALF MED

## (undated) DEVICE — TUBING MEDI-VAC W MAXIGRIP CONNECTORS 1/4"X6'

## (undated) DEVICE — DRAPE LIGHT HANDLE COVER BLUE

## (undated) DEVICE — STAPLER COVIDIEN ENDO GIA SHORT HANDLE

## (undated) DEVICE — SOL INJ NS 0.9% 500ML 1-PORT

## (undated) DEVICE — SOLIDIFIER ISOLYZER 2000 CC

## (undated) DEVICE — TUBE O2 SUPL CRUSH RESIS CONN SOUTHSIDE ONLY

## (undated) DEVICE — ELCTR GROUNDING PAD ADULT COVIDIEN

## (undated) DEVICE — GLV 7.5 PROTEXIS

## (undated) DEVICE — SUT POLYSORB 2-0 30" V-20 UNDYED

## (undated) DEVICE — SUT BIOSYN 4-0 18" P-12

## (undated) DEVICE — NDL INSUFFLATION SURGINEEDLE 120MM

## (undated) DEVICE — CLAMP BX HOT RAD JAW 3

## (undated) DEVICE — VALVE ENDOSCOPE DEFENDO SINGLE USE

## (undated) DEVICE — DRSG MASTISOL

## (undated) DEVICE — RETRIEVER ROTH NET PLATINUM-UNIVERSAL

## (undated) DEVICE — SOL IRR BAG NS 0.9% 1000ML

## (undated) DEVICE — SUT SOFSILK 2-0 18" V-20

## (undated) DEVICE — SYR LUER LOK 20CC

## (undated) DEVICE — FOR-ESU VALLEYLAB T7E15008DX: Type: DURABLE MEDICAL EQUIPMENT

## (undated) DEVICE — PACK GENERAL LAPAROSCOPY

## (undated) DEVICE — ELCTR BOVIE PENCIL BLADE 10FT

## (undated) DEVICE — CATH ELCTR GLIDE PRB 7FR

## (undated) DEVICE — SYR LUER LOK 10CC

## (undated) DEVICE — SUT ETHIBOND 2-0 44" EN3

## (undated) DEVICE — NDL INJ SCLERO INTERJECT 23G

## (undated) DEVICE — DRSG GAUZE 4X4"

## (undated) DEVICE — TROCAR COVIDIEN VERSAONE OPTICAL BLADELESS 5MM

## (undated) DEVICE — SUT QUILL POLYPROPYLENE 1 15CM 22MM CLEAR

## (undated) DEVICE — FORMALIN CUPS 10% BUFFERED

## (undated) DEVICE — LUBE JELLY FOILPACK 36GM STERILE

## (undated) DEVICE — SENSOR O2 FINGER ADULT 24/CA

## (undated) DEVICE — SOL IRR POUR H2O 500ML

## (undated) DEVICE — FORCEP BIOPSY 2.5MM DISP

## (undated) DEVICE — SOL IRR POUR NS 0.9% 1500ML

## (undated) DEVICE — SUT SOFSILK 2-0 30" V-20

## (undated) DEVICE — FOR-ESU VALLEYLAB T7E14982DX: Type: DURABLE MEDICAL EQUIPMENT

## (undated) DEVICE — SUT MAXON 2-0 27" T-5

## (undated) DEVICE — TUBING CANNULA SALTER LABS NASAL ADULT 7FT

## (undated) DEVICE — MASK OXYGEN PANORAMIC

## (undated) DEVICE — SNARE LOOP POLY DISP 30MM LOOP

## (undated) DEVICE — TUBING STRYKER PNEUMOSURE HI FLOW INSUFFLATOR

## (undated) DEVICE — D HELP - CLEARVIEW CLEARIFY SYSTEM

## (undated) DEVICE — TUBING IV SET GRAVITY 3Y 100" MACRO

## (undated) DEVICE — DRAPE HALF SHEET 40X57"

## (undated) DEVICE — GLV 7 PROTEXIS

## (undated) DEVICE — NDL HYPO SAFE 22G X 1.5"

## (undated) DEVICE — TUBING STRYKEFLOW II SUCTION / IRRIGATOR

## (undated) DEVICE — SYR LUER LOK 50CC

## (undated) DEVICE — SUT POLYSORB 0 30" GU-46

## (undated) DEVICE — SUT MAXON 0 36" T-12

## (undated) DEVICE — DRSG STERISTRIPS 0.5X4"

## (undated) DEVICE — NDL HYPO SAFE 25G X 1.5"

## (undated) DEVICE — ADAPTER ENDO CHNL SINGLE USE

## (undated) DEVICE — BLADE SURGICAL #15 CARBON